# Patient Record
Sex: MALE | Race: WHITE | NOT HISPANIC OR LATINO | Employment: OTHER
[De-identification: names, ages, dates, MRNs, and addresses within clinical notes are randomized per-mention and may not be internally consistent; named-entity substitution may affect disease eponyms.]

---

## 2017-01-01 ENCOUNTER — HOSPITAL (OUTPATIENT)
Dept: OTHER | Age: 82
End: 2017-01-01
Attending: INTERNAL MEDICINE

## 2017-01-19 LAB — INR BLDC: 2.4

## 2017-02-01 ENCOUNTER — HOSPITAL (OUTPATIENT)
Dept: OTHER | Age: 82
End: 2017-02-01
Attending: INTERNAL MEDICINE

## 2017-02-01 PROBLEM — N39.43 POST-VOID DRIBBLING: Status: ACTIVE | Noted: 2017-02-01

## 2017-03-06 ENCOUNTER — HOSPITAL (OUTPATIENT)
Dept: OTHER | Age: 82
End: 2017-03-06
Attending: INTERNAL MEDICINE

## 2017-03-06 LAB — INR BLDC: 2.4

## 2017-04-01 ENCOUNTER — HOSPITAL (OUTPATIENT)
Dept: OTHER | Age: 82
End: 2017-04-01
Attending: INTERNAL MEDICINE

## 2017-04-07 LAB — INR BLDC: 2.4

## 2017-04-24 ENCOUNTER — PRIOR ORIGINAL RECORDS (OUTPATIENT)
Dept: OTHER | Age: 82
End: 2017-04-24

## 2017-05-01 ENCOUNTER — HOSPITAL (OUTPATIENT)
Dept: OTHER | Age: 82
End: 2017-05-01
Attending: INTERNAL MEDICINE

## 2017-05-08 LAB — INR BLDC: 1.9

## 2017-06-26 PROBLEM — I35.0 NONRHEUMATIC AORTIC VALVE STENOSIS: Status: ACTIVE | Noted: 2017-06-26

## 2017-07-07 ENCOUNTER — HOSPITAL (OUTPATIENT)
Dept: OTHER | Age: 82
End: 2017-07-07
Attending: INTERNAL MEDICINE

## 2017-07-07 LAB — INR BLDC: 1.6

## 2017-08-01 ENCOUNTER — HOSPITAL (OUTPATIENT)
Dept: OTHER | Age: 82
End: 2017-08-01
Attending: INTERNAL MEDICINE

## 2017-08-10 LAB — INR BLDC: 3.9

## 2017-09-01 ENCOUNTER — HOSPITAL (OUTPATIENT)
Dept: OTHER | Age: 82
End: 2017-09-01
Attending: INTERNAL MEDICINE

## 2017-09-07 LAB — INR BLDC: 2.4

## 2017-10-02 ENCOUNTER — HOSPITAL (OUTPATIENT)
Dept: OTHER | Age: 82
End: 2017-10-02
Attending: INTERNAL MEDICINE

## 2017-10-02 LAB
ANALYZER ANC (IANC): ABNORMAL
ANION GAP SERPL CALC-SCNC: 13 MMOL/L (ref 10–20)
BUN SERPL-MCNC: 20 MG/DL (ref 6–20)
BUN/CREAT SERPL: 14 (ref 7–25)
CALCIUM SERPL-MCNC: 8.3 MG/DL (ref 8.4–10.2)
CHLORIDE: 108 MMOL/L (ref 98–107)
CO2 SERPL-SCNC: 23 MMOL/L (ref 21–32)
CREAT SERPL-MCNC: 1.4 MG/DL (ref 0.67–1.17)
ERYTHROCYTE [DISTWIDTH] IN BLOOD: 14.6 % (ref 11–15)
GLUCOSE SERPL-MCNC: 144 MG/DL (ref 65–99)
HEMATOCRIT: 36.8 % (ref 39–51)
HGB BLD-MCNC: 12.3 GM/DL (ref 13–17)
INR PPP: 1.4
MCH RBC QN AUTO: 28.5 PG (ref 26–34)
MCHC RBC AUTO-ENTMCNC: 33.4 GM/DL (ref 32–36.5)
MCV RBC AUTO: 85.4 FL (ref 78–100)
PLATELET # BLD: 117 THOUSAND/MCL (ref 140–450)
POTASSIUM SERPL-SCNC: 4.4 MMOL/L (ref 3.4–5.1)
PROTHROMBIN TIME: 15.6 SECONDS (ref 9.7–11.8)
PROTHROMBIN TIME: ABNORMAL
RBC # BLD: 4.31 MILLION/MCL (ref 4.5–5.9)
SODIUM SERPL-SCNC: 140 MMOL/L (ref 135–145)
WBC # BLD: 5.2 THOUSAND/MCL (ref 4.2–11)

## 2017-10-03 ENCOUNTER — CHARTING TRANS (OUTPATIENT)
Dept: OTHER | Age: 82
End: 2017-10-03

## 2017-10-03 LAB
ANALYZER ANC (IANC): ABNORMAL
ANION GAP SERPL CALC-SCNC: 16 MMOL/L (ref 10–20)
BUN SERPL-MCNC: 23 MG/DL (ref 6–20)
BUN/CREAT SERPL: 17 (ref 7–25)
CALCIUM SERPL-MCNC: 8.9 MG/DL (ref 8.4–10.2)
CHLORIDE: 107 MMOL/L (ref 98–107)
CO2 SERPL-SCNC: 20 MMOL/L (ref 21–32)
CREAT SERPL-MCNC: 1.33 MG/DL (ref 0.67–1.17)
ERYTHROCYTE [DISTWIDTH] IN BLOOD: 14.8 % (ref 11–15)
GLUCOSE SERPL-MCNC: 112 MG/DL (ref 65–99)
HEMATOCRIT: 38.7 % (ref 39–51)
HGB BLD-MCNC: 13.2 GM/DL (ref 13–17)
MCH RBC QN AUTO: 28.9 PG (ref 26–34)
MCHC RBC AUTO-ENTMCNC: 34.1 GM/DL (ref 32–36.5)
MCV RBC AUTO: 84.9 FL (ref 78–100)
PLATELET # BLD: 124 THOUSAND/MCL (ref 140–450)
POTASSIUM SERPL-SCNC: 4.5 MMOL/L (ref 3.4–5.1)
RBC # BLD: 4.56 MILLION/MCL (ref 4.5–5.9)
SODIUM SERPL-SCNC: 138 MMOL/L (ref 135–145)
WBC # BLD: 6.4 THOUSAND/MCL (ref 4.2–11)

## 2017-10-04 LAB
ANALYZER ANC (IANC): ABNORMAL
ANION GAP SERPL CALC-SCNC: 15 MMOL/L (ref 10–20)
BUN SERPL-MCNC: 20 MG/DL (ref 6–20)
BUN/CREAT SERPL: 15 (ref 7–25)
CALCIUM SERPL-MCNC: 8.5 MG/DL (ref 8.4–10.2)
CHLORIDE: 109 MMOL/L (ref 98–107)
CO2 SERPL-SCNC: 21 MMOL/L (ref 21–32)
CREAT SERPL-MCNC: 1.35 MG/DL (ref 0.67–1.17)
ERYTHROCYTE [DISTWIDTH] IN BLOOD: 15.1 % (ref 11–15)
GLUCOSE SERPL-MCNC: 147 MG/DL (ref 65–99)
HEMATOCRIT: 32.9 % (ref 39–51)
HGB BLD-MCNC: 11.2 GM/DL (ref 13–17)
MCH RBC QN AUTO: 28.9 PG (ref 26–34)
MCHC RBC AUTO-ENTMCNC: 34 GM/DL (ref 32–36.5)
MCV RBC AUTO: 85 FL (ref 78–100)
PLATELET # BLD: 160 THOUSAND/MCL (ref 140–450)
POTASSIUM SERPL-SCNC: 4 MMOL/L (ref 3.4–5.1)
RBC # BLD: 3.87 MILLION/MCL (ref 4.5–5.9)
SODIUM SERPL-SCNC: 141 MMOL/L (ref 135–145)
WBC # BLD: 7.8 THOUSAND/MCL (ref 4.2–11)

## 2017-10-05 ENCOUNTER — HOSPITAL (OUTPATIENT)
Dept: OTHER | Age: 82
End: 2017-10-05
Attending: INTERNAL MEDICINE

## 2017-10-05 LAB — INR BLDC: 1.2

## 2017-10-09 ENCOUNTER — HOSPITAL ENCOUNTER (OUTPATIENT)
Dept: LAB | Facility: HOSPITAL | Age: 82
Discharge: HOME | End: 2017-10-09
Attending: THORACIC SURGERY (CARDIOTHORACIC VASCULAR SURGERY)
Payer: MEDICARE

## 2017-10-09 ENCOUNTER — HOSPITAL ENCOUNTER (OUTPATIENT)
Dept: ULTRASOUND IMAGING | Facility: HOSPITAL | Age: 82
Discharge: HOME | End: 2017-10-09
Attending: NURSE PRACTITIONER | Admitting: INTERNAL MEDICINE
Payer: MEDICARE

## 2017-10-09 ENCOUNTER — HOSPITAL ENCOUNTER (OUTPATIENT)
Dept: CT IMAGING | Facility: HOSPITAL | Age: 82
Discharge: HOME | End: 2017-10-09
Attending: NURSE PRACTITIONER
Payer: MEDICARE

## 2017-10-09 ENCOUNTER — HOSPITAL ENCOUNTER (OUTPATIENT)
Dept: INTERVENTIONAL RADIOLOGY/VASCULAR | Facility: HOSPITAL | Age: 82
Discharge: HOME | End: 2017-10-09
Attending: INTERNAL MEDICINE | Admitting: INTERNAL MEDICINE
Payer: MEDICARE

## 2017-10-09 ENCOUNTER — HOSPITAL ENCOUNTER (OUTPATIENT)
Dept: CV DIAGNOSTICS | Facility: HOSPITAL | Age: 82
Discharge: HOME | End: 2017-10-09
Attending: THORACIC SURGERY (CARDIOTHORACIC VASCULAR SURGERY)
Payer: MEDICARE

## 2017-10-09 ENCOUNTER — HOSPITAL ENCOUNTER (OUTPATIENT)
Dept: CT IMAGING | Facility: HOSPITAL | Age: 82
Discharge: HOME | End: 2017-10-09
Attending: NURSE PRACTITIONER | Admitting: INTERNAL MEDICINE
Payer: MEDICARE

## 2017-10-09 DIAGNOSIS — R09.89 OTHER SPECIFIED SYMPTOMS AND SIGNS INVOLVING THE CIRCULATORY AND RESPIRATORY SYSTEMS: ICD-10-CM

## 2017-10-09 DIAGNOSIS — I35.0 SEVERE AORTIC STENOSIS: ICD-10-CM

## 2017-10-09 DIAGNOSIS — I35.0 AORTIC STENOSIS: ICD-10-CM

## 2017-10-09 PROBLEM — I25.118 CORONARY ARTERY DISEASE OF NATIVE ARTERY OF NATIVE HEART WITH STABLE ANGINA PECTORIS (HCC): Status: ACTIVE | Noted: 2017-10-03

## 2017-10-09 PROCEDURE — 99212 OFFICE O/P EST SF 10 MIN: CPT | Performed by: INTERNAL MEDICINE

## 2017-10-09 PROCEDURE — 94010 BREATHING CAPACITY TEST: CPT

## 2017-10-09 PROCEDURE — 93010 ELECTROCARDIOGRAM REPORT: CPT | Performed by: INTERNAL MEDICINE

## 2017-10-09 PROCEDURE — 81001 URINALYSIS AUTO W/SCOPE: CPT

## 2017-10-09 PROCEDURE — 96360 HYDRATION IV INFUSION INIT: CPT

## 2017-10-09 PROCEDURE — 96361 HYDRATE IV INFUSION ADD-ON: CPT

## 2017-10-09 PROCEDURE — 75635 CT ANGIO ABDOMINAL ARTERIES: CPT | Performed by: NURSE PRACTITIONER

## 2017-10-09 PROCEDURE — 71275 CT ANGIOGRAPHY CHEST: CPT | Performed by: NURSE PRACTITIONER

## 2017-10-09 PROCEDURE — 93005 ELECTROCARDIOGRAM TRACING: CPT

## 2017-10-09 PROCEDURE — 93880 EXTRACRANIAL BILAT STUDY: CPT | Performed by: NURSE PRACTITIONER

## 2017-10-09 RX ORDER — SODIUM CHLORIDE 9 MG/ML
INJECTION, SOLUTION INTRAVENOUS CONTINUOUS
Status: ACTIVE | OUTPATIENT
Start: 2017-10-09 | End: 2017-10-09

## 2017-10-09 RX ADMIN — SODIUM CHLORIDE: 9 INJECTION, SOLUTION INTRAVENOUS at 08:15:00

## 2017-10-09 NOTE — PROCEDURES
Spirometry and flow volume loop appear normal with no evidence of airway obstruction or restriction.

## 2017-10-16 PROCEDURE — 81015 MICROSCOPIC EXAM OF URINE: CPT | Performed by: UROLOGY

## 2017-10-23 ENCOUNTER — ANESTHESIA EVENT (OUTPATIENT)
Dept: CARDIAC SURGERY | Facility: HOSPITAL | Age: 82
End: 2017-10-23

## 2017-10-23 ENCOUNTER — APPOINTMENT (OUTPATIENT)
Dept: GENERAL RADIOLOGY | Facility: HOSPITAL | Age: 82
DRG: 266 | End: 2017-10-23
Attending: NURSE PRACTITIONER
Payer: MEDICARE

## 2017-10-23 ENCOUNTER — HOSPITAL ENCOUNTER (INPATIENT)
Facility: HOSPITAL | Age: 82
LOS: 5 days | Discharge: HOME | DRG: 266 | End: 2017-10-28
Attending: INTERNAL MEDICINE | Admitting: INTERNAL MEDICINE
Payer: MEDICARE

## 2017-10-23 PROCEDURE — 85027 COMPLETE CBC AUTOMATED: CPT | Performed by: NURSE PRACTITIONER

## 2017-10-23 PROCEDURE — 87081 CULTURE SCREEN ONLY: CPT | Performed by: NURSE PRACTITIONER

## 2017-10-23 PROCEDURE — 71010 XR CHEST AP PORTABLE  (CPT=71010): CPT | Performed by: NURSE PRACTITIONER

## 2017-10-23 PROCEDURE — 86920 COMPATIBILITY TEST SPIN: CPT

## 2017-10-23 PROCEDURE — 05H933Z INSERTION OF INFUSION DEVICE INTO RIGHT BRACHIAL VEIN, PERCUTANEOUS APPROACH: ICD-10-PCS | Performed by: HOSPITALIST

## 2017-10-23 PROCEDURE — 86901 BLOOD TYPING SEROLOGIC RH(D): CPT | Performed by: NURSE PRACTITIONER

## 2017-10-23 PROCEDURE — 81003 URINALYSIS AUTO W/O SCOPE: CPT | Performed by: NURSE PRACTITIONER

## 2017-10-23 PROCEDURE — 83036 HEMOGLOBIN GLYCOSYLATED A1C: CPT | Performed by: NURSE PRACTITIONER

## 2017-10-23 PROCEDURE — 83735 ASSAY OF MAGNESIUM: CPT | Performed by: NURSE PRACTITIONER

## 2017-10-23 PROCEDURE — 36569 INSJ PICC 5 YR+ W/O IMAGING: CPT

## 2017-10-23 PROCEDURE — 83880 ASSAY OF NATRIURETIC PEPTIDE: CPT | Performed by: NURSE PRACTITIONER

## 2017-10-23 PROCEDURE — 85610 PROTHROMBIN TIME: CPT | Performed by: NURSE PRACTITIONER

## 2017-10-23 PROCEDURE — 76937 US GUIDE VASCULAR ACCESS: CPT

## 2017-10-23 PROCEDURE — 80053 COMPREHEN METABOLIC PANEL: CPT | Performed by: NURSE PRACTITIONER

## 2017-10-23 PROCEDURE — 93005 ELECTROCARDIOGRAM TRACING: CPT

## 2017-10-23 PROCEDURE — 93010 ELECTROCARDIOGRAM REPORT: CPT | Performed by: INTERNAL MEDICINE

## 2017-10-23 PROCEDURE — 86850 RBC ANTIBODY SCREEN: CPT | Performed by: NURSE PRACTITIONER

## 2017-10-23 PROCEDURE — 86900 BLOOD TYPING SEROLOGIC ABO: CPT | Performed by: NURSE PRACTITIONER

## 2017-10-23 PROCEDURE — B54MZZA ULTRASONOGRAPHY OF RIGHT UPPER EXTREMITY VEINS, GUIDANCE: ICD-10-PCS | Performed by: HOSPITALIST

## 2017-10-23 RX ORDER — ALFUZOSIN HYDROCHLORIDE 10 MG/1
10 TABLET, EXTENDED RELEASE ORAL
Status: DISCONTINUED | OUTPATIENT
Start: 2017-10-24 | End: 2017-10-28

## 2017-10-23 RX ORDER — SODIUM CHLORIDE 9 MG/ML
INJECTION, SOLUTION INTRAVENOUS CONTINUOUS
Status: DISCONTINUED | OUTPATIENT
Start: 2017-10-23 | End: 2017-10-24

## 2017-10-23 RX ORDER — CLOPIDOGREL BISULFATE 75 MG/1
75 TABLET ORAL DAILY
Status: DISCONTINUED | OUTPATIENT
Start: 2017-10-24 | End: 2017-10-28

## 2017-10-23 RX ORDER — FINASTERIDE 5 MG/1
5 TABLET, FILM COATED ORAL NIGHTLY
Status: DISCONTINUED | OUTPATIENT
Start: 2017-10-23 | End: 2017-10-28

## 2017-10-23 RX ORDER — ACETAMINOPHEN 160 MG
2000 TABLET,DISINTEGRATING ORAL DAILY
Status: DISCONTINUED | OUTPATIENT
Start: 2017-10-23 | End: 2017-10-28

## 2017-10-23 RX ORDER — ALPRAZOLAM 0.25 MG/1
0.25 TABLET ORAL
Status: DISCONTINUED | OUTPATIENT
Start: 2017-10-23 | End: 2017-10-28

## 2017-10-23 RX ORDER — CHLORHEXIDINE GLUCONATE 4 G/100ML
30 SOLUTION TOPICAL ONCE
Status: COMPLETED | OUTPATIENT
Start: 2017-10-23 | End: 2017-10-23

## 2017-10-23 RX ORDER — NORTRIPTYLINE HYDROCHLORIDE 10 MG/1
10 CAPSULE ORAL
Status: DISCONTINUED | OUTPATIENT
Start: 2017-10-23 | End: 2017-10-28

## 2017-10-23 RX ORDER — ATORVASTATIN CALCIUM 40 MG/1
40 TABLET, FILM COATED ORAL NIGHTLY
Status: DISCONTINUED | OUTPATIENT
Start: 2017-10-23 | End: 2017-10-28

## 2017-10-23 NOTE — CM/SW NOTE
10/23/17 1600   CM/SW Referral Data   Referral Source Social Work (self-referral)   Reason for Referral Discharge planning   Informant Patient   Pertinent Medical Hx   Primary Care Physician Name Cheko Hamilton   Patient Info   Patient's Mental Status Alert;Melvin

## 2017-10-23 NOTE — ANESTHESIA PREPROCEDURE EVALUATION
PRE-OP EVALUATION    Patient Name: Tania London    Pre-op Diagnosis: aortic stenosis    Procedure(s):  transcatheter aortic valve replacement     Surgeon(s) and Role:     * Rabia August MD - Primary     * Evangelina Ambriz MD    Pre-op vitals reviewe mouth daily as needed for Sleep.  Disp: 15 Tab Rfl: 5   ENALAPRIL MALEATE 2.5 MG OR TABS 1 TABLET daily at noon Disp:  Rfl:    ALLOPURINOL 100 MG OR TABS one half at noon time daily Disp:  Rfl:    SIMVASTATIN 80 MG OR TABS one half TABLET AT BEDTIME Disp: anxiety  (+) CVA                    CBP  Lung and prostate ca      Past Surgical History:  No date: APPENDECTOMY  2009: BACK SURGERY      Comment: CANCER  No date: CATARACT Bilateral  No date: CATH DRUG ELUTING STENT  No date: CHOLECYSTECTOMY  2011: Hilton Slaughter findings  Partial dentures          ASA: 4   Plan: general  NPO status verified and patient meets guidelines. Post-procedure pain management plan discussed with surgeon and patient.     Comment: D/w the patient the risks and benefits of general anesthesi

## 2017-10-23 NOTE — PLAN OF CARE
Received at 1100 am as direct admit from home for tavr in am. Admitted pt updated history and pta meds. Notified cardiology and [facility] hospitalist for admission. Mid line inserted, obtained ua and c&s, mrsa swab done. Labs, xray, and ekg done.  Consents on marichuy

## 2017-10-23 NOTE — H&P
[facility] Hospitalist H&P       CC: direct admit for TAVR    PCP: Daniela Molina MD    History of Present Illness: Pt is a 79 yo with mmp including but not limited to HTN/HL, CAD, severe aortic stenosis, acute on chronic diastolic heart failure, HISTORY      Comment: KIDNEY STONES  02/18/14: OTHER SURGICAL HISTORY      Comment: Prostate Biopsy - Dr. Kelli Hernandez  6/4/14: OTHER SURGICAL HISTORY      Comment: Cysto- Dr. Kelli Hernandez  No date: TONSILLECTOMY     ALL:    Aspirin                     Comment:CAN N °C) (Oral)   Resp 19   Ht 154.9 cm (5' 1\")   Wt 154 lb 1.6 oz (69.9 kg)   SpO2 100%   BMI 29.12 kg/m²   General:  Alert, NAD, appears younger than stated age   Head:  Normocephalic, without obvious abnormality, atraumatic.    Eyes:  Sclera anicteric,  EOMs anticoagulation given upcoming procedure    Outpatient records or previous hospital records reviewed. Further recommendations pending patient's clinical course.   [facility] hospitalist to continue to follow patient while in house    Patient and/or patient's f

## 2017-10-23 NOTE — CONSULTS
Cardiothoracic Surgery  TAVR Consult    TAVR Office Consult  10/9/17  Iliana Cleary  9/21/24  Dr Lc Mendez  Referring: Dr Jacobo Guido  PCP: Dr Darcy Santos  80year old with symptomatic aortic stenosis  Echo:  [place] . 76 cm2     M/P gradient: 41/74  m

## 2017-10-24 ENCOUNTER — SURGERY (OUTPATIENT)
Age: 82
End: 2017-10-24

## 2017-10-24 ENCOUNTER — APPOINTMENT (OUTPATIENT)
Dept: CV DIAGNOSTICS | Facility: HOSPITAL | Age: 82
DRG: 266 | End: 2017-10-24
Attending: NURSE PRACTITIONER
Payer: MEDICARE

## 2017-10-24 ENCOUNTER — ANESTHESIA (OUTPATIENT)
Dept: CARDIAC SURGERY | Facility: HOSPITAL | Age: 82
End: 2017-10-24

## 2017-10-24 PROCEDURE — 84132 ASSAY OF SERUM POTASSIUM: CPT

## 2017-10-24 PROCEDURE — 85347 COAGULATION TIME ACTIVATED: CPT

## 2017-10-24 PROCEDURE — S0028 INJECTION, FAMOTIDINE, 20 MG: HCPCS | Performed by: NURSE PRACTITIONER

## 2017-10-24 PROCEDURE — 87081 CULTURE SCREEN ONLY: CPT | Performed by: HOSPITALIST

## 2017-10-24 PROCEDURE — 80048 BASIC METABOLIC PNL TOTAL CA: CPT | Performed by: NURSE PRACTITIONER

## 2017-10-24 PROCEDURE — 82803 BLOOD GASES ANY COMBINATION: CPT

## 2017-10-24 PROCEDURE — 02RF38Z REPLACEMENT OF AORTIC VALVE WITH ZOOPLASTIC TISSUE, PERCUTANEOUS APPROACH: ICD-10-PCS | Performed by: INTERNAL MEDICINE

## 2017-10-24 PROCEDURE — 93005 ELECTROCARDIOGRAM TRACING: CPT

## 2017-10-24 PROCEDURE — 85027 COMPLETE CBC AUTOMATED: CPT | Performed by: NURSE PRACTITIONER

## 2017-10-24 PROCEDURE — 82330 ASSAY OF CALCIUM: CPT

## 2017-10-24 PROCEDURE — 84100 ASSAY OF PHOSPHORUS: CPT | Performed by: NURSE PRACTITIONER

## 2017-10-24 PROCEDURE — 93010 ELECTROCARDIOGRAM REPORT: CPT | Performed by: INTERNAL MEDICINE

## 2017-10-24 PROCEDURE — 85025 COMPLETE CBC W/AUTO DIFF WBC: CPT | Performed by: NURSE PRACTITIONER

## 2017-10-24 PROCEDURE — 85014 HEMATOCRIT: CPT

## 2017-10-24 PROCEDURE — 83735 ASSAY OF MAGNESIUM: CPT | Performed by: NURSE PRACTITIONER

## 2017-10-24 PROCEDURE — 83735 ASSAY OF MAGNESIUM: CPT | Performed by: HOSPITALIST

## 2017-10-24 PROCEDURE — 84295 ASSAY OF SERUM SODIUM: CPT

## 2017-10-24 PROCEDURE — 80053 COMPREHEN METABOLIC PANEL: CPT | Performed by: NURSE PRACTITIONER

## 2017-10-24 PROCEDURE — B410YZZ FLUOROSCOPY OF ABDOMINAL AORTA USING OTHER CONTRAST: ICD-10-PCS | Performed by: INTERNAL MEDICINE

## 2017-10-24 DEVICE — VALVE EVOLUT CORE 29MM: Type: IMPLANTABLE DEVICE | Site: AORTA | Status: FUNCTIONAL

## 2017-10-24 RX ORDER — DOCUSATE SODIUM 100 MG/1
100 CAPSULE, LIQUID FILLED ORAL 2 TIMES DAILY
Status: DISCONTINUED | OUTPATIENT
Start: 2017-10-24 | End: 2017-10-28

## 2017-10-24 RX ORDER — SODIUM CHLORIDE, SODIUM LACTATE, POTASSIUM CHLORIDE, CALCIUM CHLORIDE 600; 310; 30; 20 MG/100ML; MG/100ML; MG/100ML; MG/100ML
INJECTION, SOLUTION INTRAVENOUS CONTINUOUS
Status: DISCONTINUED | OUTPATIENT
Start: 2017-10-24 | End: 2017-10-24

## 2017-10-24 RX ORDER — BISACODYL 10 MG
10 SUPPOSITORY, RECTAL RECTAL
Status: DISCONTINUED | OUTPATIENT
Start: 2017-10-24 | End: 2017-10-28

## 2017-10-24 RX ORDER — HYDRALAZINE HYDROCHLORIDE 20 MG/ML
10 INJECTION INTRAMUSCULAR; INTRAVENOUS EVERY 4 HOURS PRN
Status: DISCONTINUED | OUTPATIENT
Start: 2017-10-24 | End: 2017-10-28

## 2017-10-24 RX ORDER — FAMOTIDINE 20 MG/1
20 TABLET ORAL DAILY
Status: DISCONTINUED | OUTPATIENT
Start: 2017-10-24 | End: 2017-10-28

## 2017-10-24 RX ORDER — MORPHINE SULFATE 4 MG/ML
2 INJECTION, SOLUTION INTRAMUSCULAR; INTRAVENOUS EVERY 2 HOUR PRN
Status: DISCONTINUED | OUTPATIENT
Start: 2017-10-24 | End: 2017-10-28

## 2017-10-24 RX ORDER — NALOXONE HYDROCHLORIDE 0.4 MG/ML
80 INJECTION, SOLUTION INTRAMUSCULAR; INTRAVENOUS; SUBCUTANEOUS AS NEEDED
Status: ACTIVE | OUTPATIENT
Start: 2017-10-24 | End: 2017-10-24

## 2017-10-24 RX ORDER — LACTULOSE 20 G/30ML
20 SOLUTION ORAL DAILY PRN
Status: DISCONTINUED | OUTPATIENT
Start: 2017-10-24 | End: 2017-10-28

## 2017-10-24 RX ORDER — SODIUM CHLORIDE 9 MG/ML
INJECTION, SOLUTION INTRAVENOUS CONTINUOUS
Status: DISCONTINUED | OUTPATIENT
Start: 2017-10-24 | End: 2017-10-28

## 2017-10-24 RX ORDER — DOBUTAMINE HYDROCHLORIDE 200 MG/100ML
INJECTION INTRAVENOUS CONTINUOUS PRN
Status: DISCONTINUED | OUTPATIENT
Start: 2017-10-24 | End: 2017-10-28

## 2017-10-24 RX ORDER — HYDROCODONE BITARTRATE AND ACETAMINOPHEN 5; 325 MG/1; MG/1
1 TABLET ORAL EVERY 6 HOURS PRN
Status: DISCONTINUED | OUTPATIENT
Start: 2017-10-24 | End: 2017-10-28

## 2017-10-24 RX ORDER — ALBUMIN, HUMAN INJ 5% 5 %
250 SOLUTION INTRAVENOUS ONCE AS NEEDED
Status: ACTIVE | OUTPATIENT
Start: 2017-10-24 | End: 2017-10-24

## 2017-10-24 RX ORDER — HEPARIN SODIUM 5000 [USP'U]/ML
INJECTION, SOLUTION INTRAVENOUS; SUBCUTANEOUS
Status: COMPLETED
Start: 2017-10-24 | End: 2017-10-24

## 2017-10-24 RX ORDER — PROTAMINE SULFATE 10 MG/ML
INJECTION, SOLUTION INTRAVENOUS
Status: COMPLETED
Start: 2017-10-24 | End: 2017-10-24

## 2017-10-24 RX ORDER — METOCLOPRAMIDE HYDROCHLORIDE 5 MG/ML
10 INJECTION INTRAMUSCULAR; INTRAVENOUS AS NEEDED
Status: ACTIVE | OUTPATIENT
Start: 2017-10-24 | End: 2017-10-24

## 2017-10-24 RX ORDER — FAMOTIDINE 10 MG/ML
20 INJECTION, SOLUTION INTRAVENOUS DAILY
Status: DISCONTINUED | OUTPATIENT
Start: 2017-10-24 | End: 2017-10-27

## 2017-10-24 RX ORDER — ONDANSETRON 2 MG/ML
4 INJECTION INTRAMUSCULAR; INTRAVENOUS EVERY 6 HOURS PRN
Status: ACTIVE | OUTPATIENT
Start: 2017-10-24 | End: 2017-10-26

## 2017-10-24 RX ORDER — ONDANSETRON 2 MG/ML
4 INJECTION INTRAMUSCULAR; INTRAVENOUS AS NEEDED
Status: ACTIVE | OUTPATIENT
Start: 2017-10-24 | End: 2017-10-24

## 2017-10-24 RX ORDER — NITROGLYCERIN 20 MG/100ML
INJECTION INTRAVENOUS CONTINUOUS PRN
Status: DISCONTINUED | OUTPATIENT
Start: 2017-10-24 | End: 2017-10-28

## 2017-10-24 RX ORDER — MORPHINE SULFATE 4 MG/ML
4 INJECTION, SOLUTION INTRAMUSCULAR; INTRAVENOUS EVERY 2 HOUR PRN
Status: DISCONTINUED | OUTPATIENT
Start: 2017-10-24 | End: 2017-10-28

## 2017-10-24 RX ORDER — POLYETHYLENE GLYCOL 3350 17 G/17G
1 POWDER, FOR SOLUTION ORAL DAILY PRN
Status: DISCONTINUED | OUTPATIENT
Start: 2017-10-24 | End: 2017-10-28

## 2017-10-24 RX ORDER — CEFAZOLIN SODIUM 1 G/3ML
INJECTION, POWDER, FOR SOLUTION INTRAMUSCULAR; INTRAVENOUS
Status: DISCONTINUED | OUTPATIENT
Start: 2017-10-24 | End: 2017-10-24 | Stop reason: ALTCHOICE

## 2017-10-24 NOTE — PLAN OF CARE
Pt received at [phone] [id number] s/p TAVR, extubated shortly after arrival to CNICU per anesthesia and RT, tolerated well, scant bloody drainage from mouth noted, a/ox4, following commands, SR on monitor with new BBB and 1st degree AV Block- EKG completed with results

## 2017-10-24 NOTE — PROGRESS NOTES
[facility] Hospitalist Progress Note     PCP: Jesus Merino MD    CC:  Follow up    SUBJECTIVE:  Pt down for TAVR at the time of my rounds. Patient not seen. Chart reviewed.     OBJECTIVE:  Temp:  [97.6 °F (36.4 °C)-98 °F (36.7 °C)] 97.6 °F (36.4 °C tolerate cpap. Monitor     **BPH-stable, continue home meds     **CKD-appears to be at baseline, monitor     **chronic anemia- suspect secondary to ckd, monitor     **PPx-scds    Questions/concerns were discussed with patient and/or family by bedside.

## 2017-10-24 NOTE — PROGRESS NOTES
[facility] Cardiology  Progress Note    Nj Double Patient Status:  Inpatient    1924 MRN [id number]   [facility] 2NE-A Attending Chon Avalos, *   Hosp Day # 1 PCP Lainey Thorne MD     The patient mean/peak gradient 41/74mmHg, peak velocity 4.3m/sec, AI trace, MR mild, TR milde        Most recent cath was: on 10/3/17 showed: 90% prox RCA, mid RCA 70% with angioplasty and OMAR to these lesions        HISTORY:       Past Medical History:   Diagnosis Da reports that he drinks alcohol.  He reports that he does not use drugs.       Allergies:     Aspirin                     Comment:CAN NOT TAKE BECAUSE OF COUMADIN   Current Meds:     Current Outpatient Prescriptions:  Clopidogrel Bisulfate 75 MG Oral Tab Ken Se 1.46 (H)   ----------     EKG showed:  Normal sinus rhythm with 1st degree AVB and PAC's        ASSESSMENT/PLAN:      1. Severe Aortic Stenosis- Discussed TAVR procedure at length. Patient is going to have pre-procedure workup completed today.  Benefits and

## 2017-10-24 NOTE — OPERATIVE REPORT
[address] [place]    PATIENT'S NAME: Mireille Jessica   ATTENDING PHYSICIAN: Mendy Little. Angelica Farley MD   OPERATING PHYSICIAN: Travis Bashir M.D.    PATIENT ACCOUNT#:   [de-identified]    LOCATION:  [address] [place] [address]  MEDICAL RECORD #:   [id number]       DATE OF BIRTH M.D.  d: 10/24/2017 12:07:09  t: 10/24/2017 13:47:28  [facility] [id number]  [facility]/    cc: Standley Laws, M.D. Jerene Larve, M.D. Tamar Dunn Michaele Hatchet, M.D.

## 2017-10-24 NOTE — PROGRESS NOTES
[facility] Cardiology  Progress Note    Salima Daniels Patient Status:  Inpatient    1924 MRN [id number]   [facility] 6NE-A Attending Carly Watts, [address] Day # 1 PCP Mickael Boast, MD     ADDENDUM:  Claudia Green norepinephrine (LEVOPHED) 4 mg/250 ml premix infusion 0.5-30 mcg/min Intravenous Continuous PRN   EPINEPHrine HCl (ADRENALIN) 4 mg in sodium chloride 0.9 % 250 mL infusion 1-10 mcg/min Intravenous Continuous PRN   nitroGLYCERIN infusion 50mg in D5W 250ml Units Oral Daily   Clopidogrel Bisulfate (PLAVIX) tab 75 mg 75 mg Oral Daily   atorvastatin (LIPITOR) tab 40 mg 40 mg Oral Nightly       Laboratory Data:    Lab Results  Component Value Date   WBC 5.7 10/24/2017   HGB 10.9 10/24/2017   HCT 33.2 10/24/2017

## 2017-10-24 NOTE — OPERATIVE REPORT
Cardiovascular Surgery Operative Note  TAVR            INDICATIONS:         80year old with symptomatic aortic stenosis  STS Risk Score: 7.3 %     Euroscore: 3.9 %  The assessment is that the patient is at intermediate risk for open AVR and would be best

## 2017-10-24 NOTE — PROGRESS NOTES
PROCEDURE PERFORMED: Transcatheter aortic valve replacement, Evolute Pro 29mm aortic valve, right  transfemoral approach percutaneously. OPERATORS:   1. Cardiovascular surgeon, Dr. Kimmie Chandra.   2. Interventional Cardiology, Dr. Gia Oliver and Dr. Daphne Oliveros

## 2017-10-24 NOTE — PROGRESS NOTES
Extubation note    Patient was extubated at bedside. Awake and alert. Follwing commands. On face mask, NRB.  100% SpO2.       Adelfo Lake

## 2017-10-24 NOTE — OPERATIVE REPORT
[address] [place]    PATIENT'S NAME: Joselito Ortiz   ATTENDING PHYSICIAN: Oj Bill.  Nayeli Kelley MD   OPERATING PHYSICIAN: Haydee Briones MD   PATIENT ACCOUNT#:   [id number]    [facility] [address] [place] [address]  MEDICAL RECORD #:   [id number]       DATE OF BIRTH:  [birth year] MD  d: 10/23/2017 17:31:45  t: 10/24/2017 06:34:08  [facility] [id number]  /

## 2017-10-24 NOTE — ANESTHESIA POSTPROCEDURE EVALUATION
[facility] Status:  [facility]   Age/Gender 80year old male MRN [id number]   [facility] 6NE-A Attending Salvatore Harada, [facility] Day # 1 PCP Anrdeia Dyson MD       Anesthesia Post-op Note

## 2017-10-24 NOTE — PROGRESS NOTES
[facility] Pharmacy Note:  Renal Dose Adjustment    Evin Barrientos has been prescribed famotidine (PEPCID) 20 mg intravenously or orally very 12 hours. Estimated Creatinine Clearance: 25.9 mL/min (based on SCr of 1.32 mg/dL (H)).     His calculated creatinine

## 2017-10-25 ENCOUNTER — APPOINTMENT (OUTPATIENT)
Dept: CV DIAGNOSTICS | Facility: HOSPITAL | Age: 82
DRG: 266 | End: 2017-10-25
Attending: NURSE PRACTITIONER
Payer: MEDICARE

## 2017-10-25 PROCEDURE — 93306 TTE W/DOPPLER COMPLETE: CPT | Performed by: NURSE PRACTITIONER

## 2017-10-25 PROCEDURE — 93005 ELECTROCARDIOGRAM TRACING: CPT

## 2017-10-25 PROCEDURE — 97530 THERAPEUTIC ACTIVITIES: CPT

## 2017-10-25 PROCEDURE — 97165 OT EVAL LOW COMPLEX 30 MIN: CPT

## 2017-10-25 PROCEDURE — 83735 ASSAY OF MAGNESIUM: CPT | Performed by: NURSE PRACTITIONER

## 2017-10-25 PROCEDURE — 80048 BASIC METABOLIC PNL TOTAL CA: CPT | Performed by: NURSE PRACTITIONER

## 2017-10-25 PROCEDURE — 97161 PT EVAL LOW COMPLEX 20 MIN: CPT

## 2017-10-25 PROCEDURE — 85027 COMPLETE CBC AUTOMATED: CPT | Performed by: NURSE PRACTITIONER

## 2017-10-25 PROCEDURE — 93010 ELECTROCARDIOGRAM REPORT: CPT | Performed by: INTERNAL MEDICINE

## 2017-10-25 RX ORDER — ACETAMINOPHEN 325 MG/1
650 TABLET ORAL EVERY 6 HOURS PRN
Status: DISCONTINUED | OUTPATIENT
Start: 2017-10-25 | End: 2017-10-28

## 2017-10-25 RX ORDER — WARFARIN SODIUM 2.5 MG/1
2.5 TABLET ORAL NIGHTLY
Status: DISCONTINUED | OUTPATIENT
Start: 2017-10-25 | End: 2017-10-25

## 2017-10-25 RX ORDER — CHLORHEXIDINE GLUCONATE 4 G/100ML
30 SOLUTION TOPICAL
Status: COMPLETED | OUTPATIENT
Start: 2017-10-26 | End: 2017-10-26

## 2017-10-25 NOTE — PROGRESS NOTES
[address] [place] [address] Cardiology  Progress Note    Wilmore Jorge Patient Status:  Inpatient    1924 MRN [id number]   [facility] 6NE-A Attending Evelia Gunter, [facility] Day # 2 PCP Angie Felipe MD     ADDENDUM:  Richardson Goodpasture normoactive  Extremities: No clubbing/cyanosis; moves all 4 extremities normally      Current Facility-Administered Medications:  0.9%  NaCl infusion  Intravenous Continuous   DOBUTamine in D5W (DOBUTREX) 500 mg/250 ml infusion 2.5-10 mcg/kg/min Intravenou Daily   Clopidogrel Bisulfate (PLAVIX) tab 75 mg 75 mg Oral Daily   atorvastatin (LIPITOR) tab 40 mg 40 mg Oral Nightly       Laboratory Data:    Lab Results  Component Value Date   WBC 10.5 10/25/2017   HGB 11.9 10/25/2017   HCT 35.7 10/25/2017   .

## 2017-10-25 NOTE — PLAN OF CARE
Assumed care at [address]. Pt. No longer having vision issues. Up in chair at 2100 and back to bed at 2230. Pt. Tolerated activity well. Bilateral groin sites with dressings D/I, surrounding areas soft with ecchymosis present. Pt. Denies discomfort.   With

## 2017-10-25 NOTE — PLAN OF CARE
Patient/Family Goals    • Patient/Family Long Term Goal Progressing    • Patient/Family Short Term Goal Progressing          SKIN/TISSUE INTEGRITY - ADULT    • Skin integrity remains intact Progressing    • Incision(s), wounds(s) or drain site(s) healing w

## 2017-10-25 NOTE — PROGRESS NOTES
[facility] Hospitalist Progress Note     PCP: Georgia Heck MD    CC:  Follow up    SUBJECTIVE: pt seen earlier, note delayed    Pt laying in bed, wife and RN at bedside.   Pt without cp/sob/n/v/f/c    OBJECTIVE:  Temp:  [97.2 °F (36.2 °C)-98.5 °F mg Oral BID    Or   • docusate sodium  100 mg Per NG Tube BID   • famoTIDine  20 mg Oral Daily    Or   • famoTIDine  20 mg Intravenous Daily   • finasteride  5 mg Oral Nightly   • Nortriptyline HCl  10 mg Oral Noon   • Alfuzosin HCl ER  10 mg Oral Daily wi

## 2017-10-25 NOTE — CM/SW NOTE
SW met with pt to assess post TAVR. PT/OT did not identify any dc needs. Plan is for pt to dc home with spouse. No needs at dc.   Miriam Helm, 10/25/17, 11:42 AM

## 2017-10-25 NOTE — PHYSICAL THERAPY NOTE
PHYSICAL THERAPY QUICK EVALUATION - INPATIENT    Room Number: 5624/0205-G  Evaluation Date: 10/25/2017  Presenting Problem: s/p TAVR 10/24/17  Physician Order: PT Eval and Treat    Problem List  Active Problems:    * No active hospital problems.  *      P Tye  6/4/14: OTHER SURGICAL HISTORY      Comment: Cysto- Dr. Carlota Durham  No date: [facility]  Type of Home: Condo   Home Layout: One level                Lives With: Spouse  Drives: Yes  Patient Owned Equipment: None  Patient Regularl sit-stand sans AD independently. Pt ambulated 250ft sans AD with MOD I. Pt ambulates with steady step through gait pattern and good consuelo. Pt with good safety awareness and no LOB. Pt returned to room sitting up in bedside chair.  Pt educated on recommend

## 2017-10-25 NOTE — CONSULTS
[facility] Electrophysiology Consult      Estella Colindres is a 80year old male    He is s/p TAVR yesterday.  Prior he had a 1st degree AV block and post he has a 1st degree AVB and LBBB    Denies chest pain, shortness of breath, palpitations, lightheadedness, sy The risks and benefits of the procedure (including, but not limited to, hematoma, infection, pneumothorax, tamponade, renal failure from IV dye, damage to any existing leads, myocardial infarction, stroke, and death) were discussed.  The patient understands

## 2017-10-25 NOTE — OCCUPATIONAL THERAPY NOTE
OCCUPATIONAL THERAPY QUICK EVALUATION - INPATIENT    Room Number: 4517/6590-V  Evaluation Date: 10/25/2017     Type of Evaluation: Initial  Presenting Problem: TAVR    Physician Order: IP Consult to Occupational Therapy  Reason for Therapy:  ADL/IADL Dysfu CHOLECYSTECTOMY  2011: COLONOSCOPY      Comment: benign polyps Dr. Romayne Plant  No date: HERNIA SURGERY Left  No date: OTHER SURGICAL HISTORY      Comment: KIDNEY STONES  02/18/14: OTHER SURGICAL HISTORY      Comment: Prostate Biopsy - Dr. Alexandra Jarvis  6/4/14: OTHER ASSESSMENT  Supine to Sit : Supervision  Sit to Stand: Supervision    Skilled Therapy Provided: Pt was received supine in bed for session, therapist completed MMT and ROM on pt, pt was able to amb x1 in room and hallway with supervision, therapist assist p independently

## 2017-10-26 ENCOUNTER — APPOINTMENT (OUTPATIENT)
Dept: INTERVENTIONAL RADIOLOGY/VASCULAR | Facility: HOSPITAL | Age: 82
DRG: 266 | End: 2017-10-26
Attending: INTERNAL MEDICINE
Payer: MEDICARE

## 2017-10-26 ENCOUNTER — APPOINTMENT (OUTPATIENT)
Dept: GENERAL RADIOLOGY | Facility: HOSPITAL | Age: 82
DRG: 266 | End: 2017-10-26
Attending: INTERNAL MEDICINE
Payer: MEDICARE

## 2017-10-26 PROCEDURE — 71010 XR CHEST AP PORTABLE  (CPT=71010): CPT | Performed by: INTERNAL MEDICINE

## 2017-10-26 PROCEDURE — 02HK3JZ INSERTION OF PACEMAKER LEAD INTO RIGHT VENTRICLE, PERCUTANEOUS APPROACH: ICD-10-PCS | Performed by: INTERNAL MEDICINE

## 2017-10-26 PROCEDURE — 85018 HEMOGLOBIN: CPT | Performed by: HOSPITALIST

## 2017-10-26 PROCEDURE — 33208 INSRT HEART PM ATRIAL & VENT: CPT

## 2017-10-26 PROCEDURE — 0JH606Z INSERTION OF PACEMAKER, DUAL CHAMBER INTO CHEST SUBCUTANEOUS TISSUE AND FASCIA, OPEN APPROACH: ICD-10-PCS | Performed by: INTERNAL MEDICINE

## 2017-10-26 PROCEDURE — 83735 ASSAY OF MAGNESIUM: CPT | Performed by: HOSPITALIST

## 2017-10-26 PROCEDURE — 80048 BASIC METABOLIC PNL TOTAL CA: CPT | Performed by: NURSE PRACTITIONER

## 2017-10-26 PROCEDURE — 99152 MOD SED SAME PHYS/QHP 5/>YRS: CPT

## 2017-10-26 PROCEDURE — 85610 PROTHROMBIN TIME: CPT | Performed by: NURSE PRACTITIONER

## 2017-10-26 PROCEDURE — 99153 MOD SED SAME PHYS/QHP EA: CPT

## 2017-10-26 PROCEDURE — 02H63JZ INSERTION OF PACEMAKER LEAD INTO RIGHT ATRIUM, PERCUTANEOUS APPROACH: ICD-10-PCS | Performed by: INTERNAL MEDICINE

## 2017-10-26 RX ORDER — WARFARIN SODIUM 2.5 MG/1
2.5 TABLET ORAL
Status: COMPLETED | OUTPATIENT
Start: 2017-10-26 | End: 2017-10-26

## 2017-10-26 RX ORDER — ONDANSETRON 2 MG/ML
4 INJECTION INTRAMUSCULAR; INTRAVENOUS EVERY 6 HOURS PRN
Status: DISCONTINUED | OUTPATIENT
Start: 2017-10-26 | End: 2017-10-28

## 2017-10-26 RX ORDER — BACITRACIN 50000 [USP'U]/1
INJECTION, POWDER, LYOPHILIZED, FOR SOLUTION INTRAMUSCULAR
Status: COMPLETED
Start: 2017-10-26 | End: 2017-10-26

## 2017-10-26 RX ORDER — MIDAZOLAM HYDROCHLORIDE 1 MG/ML
INJECTION INTRAMUSCULAR; INTRAVENOUS
Status: COMPLETED
Start: 2017-10-26 | End: 2017-10-26

## 2017-10-26 RX ORDER — LIDOCAINE HYDROCHLORIDE 10 MG/ML
INJECTION, SOLUTION INFILTRATION; PERINEURAL
Status: COMPLETED
Start: 2017-10-26 | End: 2017-10-26

## 2017-10-26 NOTE — PLAN OF CARE
Care asumed @ 0730. Awake, alert & oriented, sitting up in chair. NPO for Pacemaker placement today. RSR with 1st degree AV block & BBB. Up to BR for 100-200cc voidings.   CARDIOVASCULAR - ADULT    • Maintains optimal cardiac output and hemodynamic stabilit

## 2017-10-26 NOTE — PLAN OF CARE
Pt oriented, calm, appropriate. He knows he is for [facility] tomorrow. Minimal assist to bathroom; BM attempted, no BM. Using call light appropriately.

## 2017-10-26 NOTE — PROGRESS NOTES
[facility] Hospitalist Progress Note     PCP: Haily Peraza MD    CC:  Follow up    SUBJECTIVE:   Pt sitting up in bed, wife at bedside. No current cp/sob/n/v/f/c.  Awaiting pacer    OBJECTIVE:  Temp:  [97.6 °F (36.4 °C)-97.8 °F (36.6 °C)] 97.7 mg Oral Nightly   • Nortriptyline HCl  10 mg Oral Noon   • Alfuzosin HCl ER  10 mg Oral Daily with breakfast   • Vitamin D3  2,000 Units Oral Daily   • Clopidogrel Bisulfate  75 mg Oral Daily   • atorvastatin  40 mg Oral Nightly     • sodium chloride Stopp

## 2017-10-26 NOTE — PROGRESS NOTES
[facility] [address] [place] [address] Cardiology  Progress Note    Era Marr Patient Status:  Inpatient    1924 MRN [id number]   [facility] 6NE-A Attending Dylan Calderon, [facility] Day # 3 PCP Kirsten Duverney, MD     ADDENDUM:  Josh Pringle 0.9%  NaCl infusion  Intravenous Continuous   DOBUTamine in D5W (DOBUTREX) 500 mg/250 ml infusion 2.5-10 mcg/kg/min Intravenous Continuous PRN   norepinephrine (LEVOPHED) 4 mg/250 ml premix infusion 0.5-30 mcg/min Intravenous Continuous PRN   EPINEPHrine 10/26/2017       Lab Results  Component Value Date   INR 1.33 (H) 10/26/2017   INR 1.37 (H) 10/23/2017   INR 2.7 (A) 09/14/2015       Lab Results  Component Value Date    10/26/2017   K 4.0 10/26/2017    10/26/2017   CO2 25.0 10/26/2017   BUN 2

## 2017-10-26 NOTE — CM/SW NOTE
ERICK met with pt and wife to discuss transportation issues. Pt is scheduled for [facility] today and most likely will dc home tomorrow.   Wife does drive but states she is a little \"shaken\" over everything that pt had gone through so does not feel comforta

## 2017-10-27 ENCOUNTER — APPOINTMENT (OUTPATIENT)
Dept: GENERAL RADIOLOGY | Facility: HOSPITAL | Age: 82
DRG: 266 | End: 2017-10-27
Attending: INTERNAL MEDICINE
Payer: MEDICARE

## 2017-10-27 PROCEDURE — 71020 XR CHEST PA + LAT CHEST (CPT=71020): CPT | Performed by: INTERNAL MEDICINE

## 2017-10-27 PROCEDURE — 85610 PROTHROMBIN TIME: CPT | Performed by: NURSE PRACTITIONER

## 2017-10-27 PROCEDURE — 83735 ASSAY OF MAGNESIUM: CPT | Performed by: HOSPITALIST

## 2017-10-27 PROCEDURE — 85018 HEMOGLOBIN: CPT | Performed by: HOSPITALIST

## 2017-10-27 PROCEDURE — 4B02XSZ MEASUREMENT OF CARDIAC PACEMAKER, EXTERNAL APPROACH: ICD-10-PCS | Performed by: INTERNAL MEDICINE

## 2017-10-27 RX ORDER — ACETAMINOPHEN 500 MG
1000 TABLET ORAL EVERY 6 HOURS
Status: DISCONTINUED | OUTPATIENT
Start: 2017-10-27 | End: 2017-10-28

## 2017-10-27 RX ORDER — ACETAMINOPHEN 325 MG/1
650 TABLET ORAL EVERY 6 HOURS PRN
Qty: 15 TABLET | Refills: 0 | Status: SHIPPED | COMMUNITY
Start: 2017-10-27

## 2017-10-27 NOTE — PROGRESS NOTES
[facility] Hospitalist Progress Note     PCP: Annika Rand MD    CC:  Follow up    SUBJECTIVE:   Pt s/p pacer. Sitting up in chair, pain to pacer site-- says it's sore. Says he has high tolerance for pain.  Breathing is fine    Says narcotics 100 mg Oral BID    Or   • docusate sodium  100 mg Per NG Tube BID   • famoTIDine  20 mg Oral Daily   • finasteride  5 mg Oral Nightly   • Nortriptyline HCl  10 mg Oral Noon   • Alfuzosin HCl ER  10 mg Oral Daily with breakfast   • Vitamin D3  2,000 Units

## 2017-10-27 NOTE — PLAN OF CARE
Patient is alert and oriented, forgetful and anxious. On room air, NSR on the tele. Have not seen any pacing. Coumadin was restarted last night. Midline flushed, blood return. Patient took gray dressing off put new one on. Denies any pain or SOB. Sit

## 2017-10-27 NOTE — PROCEDURES
[facility]    PATIENT'S NAME: Tanya Rios   ATTENDING PHYSICIAN: Dariana Hanks. Kassidy Yen MD   OPERATING PHYSICIAN: Yanick Combs M.D.    PATIENT ACCOUNT#:   [de-identified]    LOCATION:  [address] [place] [address]  MEDICAL RECORD #:   [id number]       DATE OF BIRTH In its final position, it was sutured in place. As measured through this device, initial lead thresholds were good. A pocket was made inferior and medial to the incision in the prepectoral space.   This pocket was examined for bleeding and clots, and no

## 2017-10-27 NOTE — PROGRESS NOTES
[facility] [address] [place] [address] Cardiology  Progress Note    Kim Marti Patient Status:  Inpatient    1924 MRN [id number]   [facility] 6NE-A Attending Denia Bennett, *   Hosp Day # 4 PCP Harleen Evans MD     Impression: norepinephrine (LEVOPHED) 4 mg/250 ml premix infusion 0.5-30 mcg/min Intravenous Continuous PRN   EPINEPHrine HCl (ADRENALIN) 4 mg in sodium chloride 0.9 % 250 mL infusion 1-10 mcg/min Intravenous Continuous PRN   nitroGLYCERIN infusion 50mg in D5W 250ml

## 2017-10-27 NOTE — PLAN OF CARE
Problem: CARDIOVASCULAR - ADULT  Goal: Maintains optimal cardiac output and hemodynamic stability  INTERVENTIONS:  - Monitor vital signs, rhythm, and trends  - Monitor for bleeding, hypotension and signs of decreased cardiac output  - Evaluate effectivenes Outcome: Progressing      Problem: MUSCULOSKELETAL - ADULT  Goal: Return mobility to safest level of function  INTERVENTIONS:  - Assess patient stability and activity tolerance for standing, transferring and ambulating w/ or w/o assistive devices  - Assi

## 2017-10-28 VITALS
WEIGHT: 150.38 LBS | OXYGEN SATURATION: 98 % | HEIGHT: 61 IN | SYSTOLIC BLOOD PRESSURE: 159 MMHG | DIASTOLIC BLOOD PRESSURE: 67 MMHG | BODY MASS INDEX: 28.39 KG/M2 | RESPIRATION RATE: 16 BRPM | TEMPERATURE: 99 F | HEART RATE: 73 BPM

## 2017-10-28 PROCEDURE — 80048 BASIC METABOLIC PNL TOTAL CA: CPT | Performed by: INTERNAL MEDICINE

## 2017-10-28 NOTE — PROGRESS NOTES
[facility] Cardiology  Progress Note    Thera Persons Patient Status:  Inpatient    1924 MRN [id number]   [facility] 6NE-A Attending Sami Cano, *   Hosp Day # 5 PCP Wyatt Jc MD     Impression: DOBUTamine in D5W (DOBUTREX) 500 mg/250 ml infusion 2.5-10 mcg/kg/min Intravenous Continuous PRN   norepinephrine (LEVOPHED) 4 mg/250 ml premix infusion 0.5-30 mcg/min Intravenous Continuous PRN   EPINEPHrine HCl (ADRENALIN) 4 mg in sodium chloride 0.9 % 10/28/2017   K 3.9 10/28/2017    10/28/2017   CO2 20.0 10/28/2017   BUN 23 10/28/2017   CREATSERUM 1.32 10/28/2017    10/28/2017   CA 8.4 10/28/2017

## 2017-10-28 NOTE — PLAN OF CARE
Patient is alert and oriented. On room air, NSR on the tele. Denies any pain or SOB. Patient ambulates with SBA. Left upper chest site is c/d/i. Dressing in place. POC updated, patient will be discharged when wife arrives today.    Call light within re

## 2017-10-28 NOTE — PLAN OF CARE
Patient seen and examined. The patient may be discharged from a cardiac standpoint. I personally reviewed all of the cardiac medications for discharge. The patient should follow-up in our office, as noted in the discharge instructions.     Given his recent

## 2017-10-28 NOTE — PLAN OF CARE
Problem: CARDIOVASCULAR - ADULT  Goal: Maintains optimal cardiac output and hemodynamic stability  INTERVENTIONS:  - Monitor vital signs, rhythm, and trends  - Monitor for bleeding, hypotension and signs of decreased cardiac output  - Evaluate effectivenes stability and activity tolerance for standing, transferring and ambulating w/ or w/o assistive devices  - Assist with transfers and ambulation using safe patient handling equipment as needed  - Ensure adequate protection for wounds/incisions during mobiliz

## 2017-10-28 NOTE — DISCHARGE SUMMARY
General Medicine Discharge Summary     Patient ID:  Mario Alberto Ok  80year old  9/21/1924    Admit date: 10/23/2017    Discharge date and time:  10/28/17    Attending Physician: Trey Marie CONSULT TO SOCIAL WORK    Operative Procedures: Procedure(s) (LRB):  HEART TRANS AORTIC VALVE REPLACEMENT (N/A)       Patient instructions:      Current Discharge Medication List    START taking these medications    acetaminophen 325 MG Oral Tab  Take 2 ta

## 2017-10-30 LAB — INR BLDC: 1.3

## 2017-11-01 ENCOUNTER — HOSPITAL (OUTPATIENT)
Dept: OTHER | Age: 82
End: 2017-11-01
Attending: INTERNAL MEDICINE

## 2017-11-03 PROBLEM — Z95.0 PACEMAKER: Status: ACTIVE | Noted: 2017-11-03

## 2017-11-03 PROBLEM — Z95.2 S/P TAVR (TRANSCATHETER AORTIC VALVE REPLACEMENT): Status: ACTIVE | Noted: 2017-11-03

## 2017-11-25 LAB — INR BLDC: 1.6

## 2017-11-27 NOTE — CONSULTS
[address] [facility]    PATIENT'S NAME: Ara Cortes   ATTENDING PHYSICIAN: Selene Puckett MD   CONSULTING PHYSICIAN: Marisa Sam MD   PATIENT ACCOUNT#:   [id number]    LOCATION:  [address] [place] [address]  MEDICAL RECORD #:   [id number]       DATE OF BIRTH Maciej Cristina MD  d: 10/23/2017 17:31:45  t: 11/27/2017 08:52:01  [facility] [id number]  /

## 2017-12-01 ENCOUNTER — HOSPITAL (OUTPATIENT)
Dept: OTHER | Age: 82
End: 2017-12-01
Attending: INTERNAL MEDICINE

## 2017-12-22 NOTE — PROCEDURES
[address] [facility] Cardiology  Transesophageal Echocardiogram Report    PREOPERATIVE DIAGNOSIS:   Severe aortic stenosis    POSTOPERATIVE DIAGNOSIS:  Transcatheter aortic valve replacement    PROCEDURE PERFORMED: Transesophageal echocardiogram with Doppl gradient 40 mmHg. Postprocedure, normally functioning Evolut R bioprosthetic valve with a mean gradient of 5 mmHg. Trace paravalvular aortic regurgitation (by VARC-2 criteria) was noted.     Ivelisse Jensen MD, [facility]  12/22/2017  10:54 AM

## 2017-12-28 LAB — INR BLDC: 2.8

## 2018-01-27 ENCOUNTER — HOSPITAL (OUTPATIENT)
Dept: OTHER | Age: 83
End: 2018-01-27
Attending: INTERNAL MEDICINE

## 2018-01-27 LAB — INR BLDC: 2.2

## 2018-02-20 ENCOUNTER — PRIOR ORIGINAL RECORDS (OUTPATIENT)
Dept: OTHER | Age: 83
End: 2018-02-20

## 2018-03-16 ENCOUNTER — HOSPITAL (OUTPATIENT)
Dept: OTHER | Age: 83
End: 2018-03-16
Attending: INTERNAL MEDICINE

## 2018-03-16 LAB — INR BLDC: 1.7

## 2018-04-01 ENCOUNTER — HOSPITAL (OUTPATIENT)
Dept: OTHER | Age: 83
End: 2018-04-01
Attending: INTERNAL MEDICINE

## 2018-04-03 ENCOUNTER — PRIOR ORIGINAL RECORDS (OUTPATIENT)
Dept: OTHER | Age: 83
End: 2018-04-03

## 2018-04-12 ENCOUNTER — HOSPITAL (OUTPATIENT)
Dept: OTHER | Age: 83
End: 2018-04-12
Attending: INTERNAL MEDICINE

## 2018-04-16 LAB — INR BLDC: 1.7

## 2018-05-01 ENCOUNTER — HOSPITAL (OUTPATIENT)
Dept: OTHER | Age: 83
End: 2018-05-01
Attending: INTERNAL MEDICINE

## 2018-05-18 LAB — INR BLDC: 1.8

## 2018-06-18 ENCOUNTER — HOSPITAL (OUTPATIENT)
Dept: OTHER | Age: 83
End: 2018-06-18
Attending: INTERNAL MEDICINE

## 2018-06-18 LAB
INR BLDC: NORMAL
INR PPP: 4.7
PROTHROMBIN TIME: 44.8 SECONDS (ref 9.7–11.8)
PROTHROMBIN TIME: ABNORMAL

## 2018-06-20 LAB — INR BLDC: 3.2

## 2018-06-25 LAB — INR BLDC: 1.4

## 2018-07-01 ENCOUNTER — HOSPITAL (OUTPATIENT)
Dept: OTHER | Age: 83
End: 2018-07-01
Attending: INTERNAL MEDICINE

## 2018-07-21 LAB
INR PPP: 2
PROTHROMBIN TIME: 19.4 SECONDS (ref 9.7–11.8)
PROTHROMBIN TIME: ABNORMAL

## 2018-08-01 ENCOUNTER — HOSPITAL (OUTPATIENT)
Dept: OTHER | Age: 83
End: 2018-08-01
Attending: INTERNAL MEDICINE

## 2018-08-20 LAB
INR PPP: 4.2
PROTHROMBIN TIME: 40 SECONDS (ref 9.7–11.8)
PROTHROMBIN TIME: ABNORMAL

## 2018-08-21 ENCOUNTER — PRIOR ORIGINAL RECORDS (OUTPATIENT)
Dept: OTHER | Age: 83
End: 2018-08-21

## 2018-08-27 ENCOUNTER — HOSPITAL (OUTPATIENT)
Dept: OTHER | Age: 83
End: 2018-08-27
Attending: FAMILY MEDICINE

## 2018-08-27 LAB
INR PPP: 2
PROTHROMBIN TIME: 19.6 SECONDS (ref 9.7–11.8)
PROTHROMBIN TIME: ABNORMAL

## 2018-09-11 ENCOUNTER — HOSPITAL (OUTPATIENT)
Dept: OTHER | Age: 83
End: 2018-09-11
Attending: INTERNAL MEDICINE

## 2018-09-11 LAB — INR BLDC: 2

## 2018-09-25 ENCOUNTER — HOSPITAL (OUTPATIENT)
Dept: OTHER | Age: 83
End: 2018-09-25
Attending: INTERNAL MEDICINE

## 2018-09-25 ENCOUNTER — HOSPITAL (OUTPATIENT)
Dept: OTHER | Age: 83
End: 2018-09-25
Attending: EMERGENCY MEDICINE

## 2018-09-25 LAB
AMORPH SED URNS QL MICRO: NORMAL
APPEARANCE UR: CLEAR
BILIRUB UR QL: NEGATIVE
CAOX CRY URNS QL MICRO: NORMAL
CHOLEST SERPL-MCNC: 133 MG/DL
CHOLEST/HDLC SERPL: 3.4 {RATIO}
COLOR UR: YELLOW
EPITH CASTS #/AREA URNS LPF: NORMAL /[LPF]
FATTY CASTS #/AREA URNS LPF: NORMAL /[LPF]
GLUCOSE UR-MCNC: NEGATIVE MG/DL
GRAN CASTS #/AREA URNS LPF: NORMAL /[LPF]
HDLC SERPL-MCNC: 39 MG/DL
HGB UR QL: NEGATIVE
HYALINE CASTS #/AREA URNS LPF: NORMAL /[LPF]
KETONES UR-MCNC: NEGATIVE MG/DL
LDLC SERPL CALC-MCNC: 66 MG/DL
LENGTH OF FAST TIME PATIENT: 12 HR
LEUKOCYTE ESTERASE UR QL STRIP: NEGATIVE
MICROSCOPIC (MT): NORMAL
MIXED CELL CASTS #/AREA URNS LPF: NORMAL /[LPF]
MUCOUS THREADS URNS QL MICRO: NORMAL
NITRITE UR QL: NEGATIVE
NONHDLC SERPL-MCNC: 94 MG/DL
PH UR: 5 UNIT (ref 5–7)
PROT UR QL: NEGATIVE MG/DL
RBC CASTS #/AREA URNS LPF: NORMAL /[LPF]
RENAL EPI CELLS #/AREA URNS HPF: NORMAL /[HPF]
SP GR UR: 1.01 (ref 1–1.03)
SPECIMEN SOURCE: NORMAL
SPERM URNS QL MICRO: NORMAL
T VAGINALIS URNS QL MICRO: NORMAL
TRI-PHOS CRY URNS QL MICRO: NORMAL
TRIGLYCERIDE (TRIGP): 141 MG/DL
URATE CRY URNS QL MICRO: NORMAL
URNS CMNT MICRO: NORMAL
UROBILINOGEN UR QL: 0.2 MG/DL (ref 0–1)
WAXY CASTS #/AREA URNS LPF: NORMAL /[LPF]
WBC CASTS #/AREA URNS LPF: NORMAL /[LPF]
YEAST HYPHAE URNS QL MICRO: NORMAL
YEAST URNS QL MICRO: NORMAL

## 2018-10-01 ENCOUNTER — HOSPITAL (OUTPATIENT)
Dept: OTHER | Age: 83
End: 2018-10-01
Attending: INTERNAL MEDICINE

## 2018-10-11 LAB — INR BLDC: 1.6

## 2018-10-25 LAB — INR BLDC: 1.9

## 2018-11-01 ENCOUNTER — HOSPITAL (OUTPATIENT)
Dept: OTHER | Age: 83
End: 2018-11-01
Attending: INTERNAL MEDICINE

## 2018-11-27 ENCOUNTER — LAB SERVICES (OUTPATIENT)
Dept: OTHER | Age: 83
End: 2018-11-27

## 2018-11-27 LAB
INR BLDC: 3
INR BLDC: 3
INR BLDC: NORMAL

## 2018-12-19 ENCOUNTER — HOSPITAL (OUTPATIENT)
Dept: OTHER | Age: 83
End: 2018-12-19
Attending: INTERNAL MEDICINE

## 2018-12-31 ENCOUNTER — PRIOR ORIGINAL RECORDS (OUTPATIENT)
Dept: OTHER | Age: 83
End: 2018-12-31

## 2019-01-04 ENCOUNTER — HOSPITAL (OUTPATIENT)
Dept: OTHER | Age: 84
End: 2019-01-04
Attending: INTERNAL MEDICINE

## 2019-01-04 LAB
INR BLDC: 2
INR BLDC: 2
INR BLDC: NORMAL

## 2019-02-01 ENCOUNTER — HOSPITAL (OUTPATIENT)
Dept: OTHER | Age: 84
End: 2019-02-01
Attending: INTERNAL MEDICINE

## 2019-02-01 LAB
INR BLDC: 2.2
INR BLDC: 2.2
INR BLDC: NORMAL

## 2019-02-19 ENCOUNTER — PRIOR ORIGINAL RECORDS (OUTPATIENT)
Dept: OTHER | Age: 84
End: 2019-02-19

## 2019-02-25 ENCOUNTER — PRIOR ORIGINAL RECORDS (OUTPATIENT)
Dept: OTHER | Age: 84
End: 2019-02-25

## 2019-03-01 ENCOUNTER — HOSPITAL (OUTPATIENT)
Dept: OTHER | Age: 84
End: 2019-03-01
Attending: INTERNAL MEDICINE

## 2019-03-01 LAB
INR BLDC: 1.7
INR BLDC: 1.7
INR BLDC: NORMAL

## 2019-03-04 ENCOUNTER — HOSPITAL (OUTPATIENT)
Dept: OTHER | Age: 84
End: 2019-03-04
Attending: INTERNAL MEDICINE

## 2019-03-08 ENCOUNTER — HOSPITAL (OUTPATIENT)
Dept: OTHER | Age: 84
End: 2019-03-08
Attending: INTERNAL MEDICINE

## 2019-03-29 LAB
INR BLDC: 2.2
INR BLDC: 2.2
INR BLDC: NORMAL

## 2019-04-29 ENCOUNTER — HOSPITAL (OUTPATIENT)
Dept: OTHER | Age: 84
End: 2019-04-29
Attending: INTERNAL MEDICINE

## 2019-04-29 LAB
INR BLDC: 2.1
INR BLDC: 2.1
INR BLDC: NORMAL

## 2019-05-01 ENCOUNTER — HOSPITAL (OUTPATIENT)
Dept: OTHER | Age: 84
End: 2019-05-01
Attending: INTERNAL MEDICINE

## 2019-05-30 LAB
INR BLDC: 2.4
INR BLDC: NORMAL

## 2019-06-01 ENCOUNTER — HOSPITAL (OUTPATIENT)
Dept: OTHER | Age: 84
End: 2019-06-01
Attending: INTERNAL MEDICINE

## 2019-06-28 ENCOUNTER — HOSPITAL (OUTPATIENT)
Dept: OTHER | Age: 84
End: 2019-06-28

## 2019-06-28 LAB
ALBUMIN SERPL-MCNC: 3.7 G/DL (ref 3.6–5.1)
ALP SERPL-CCNC: 121 UNITS/L (ref 45–117)
ALT SERPL-CCNC: 23 UNITS/L
ANALYZER ANC (IANC): ABNORMAL
ANION GAP SERPL CALC-SCNC: 12 MMOL/L (ref 10–20)
APTT PPP: 32 SEC (ref 22–32)
APTT PPP: NORMAL S
APTT PPP: NORMAL S
AST SERPL-CCNC: 23 UNITS/L
AST SERPL-CCNC: ABNORMAL U/L
BASOPHILS # BLD: 0 K/MCL (ref 0–0.3)
BASOPHILS NFR BLD: 0 %
BILIRUB CONJ SERPL-MCNC: 0.1 MG/DL (ref 0–0.2)
BILIRUB CONJ SERPL-MCNC: ABNORMAL MG/DL
BILIRUB SERPL-MCNC: 0.5 MG/DL (ref 0.2–1)
BUN SERPL-MCNC: 27 MG/DL (ref 6–20)
BUN/CREAT SERPL: 22 (ref 7–25)
CALCIUM SERPL-MCNC: 8.5 MG/DL (ref 8.4–10.2)
CHLORIDE SERPL-SCNC: 113 MMOL/L (ref 98–107)
CO2 SERPL-SCNC: 20 MMOL/L (ref 21–32)
CREAT SERPL-MCNC: 1.25 MG/DL (ref 0.67–1.17)
DIFFERENTIAL METHOD BLD: ABNORMAL
EOSINOPHIL # BLD: 0 K/MCL (ref 0.1–0.5)
EOSINOPHIL NFR BLD: 0 %
ERYTHROCYTE [DISTWIDTH] IN BLOOD: 14.6 % (ref 11–15)
GLUCOSE SERPL-MCNC: 207 MG/DL (ref 65–99)
HCT VFR BLD CALC: 37.1 % (ref 39–51)
HGB BLD-MCNC: 12.5 G/DL (ref 13–17)
IMM GRANULOCYTES # BLD AUTO: 0 K/MCL (ref 0–0.2)
IMM GRANULOCYTES NFR BLD: 0 %
INR PPP: 1.9
INR PPP: ABNORMAL
LIPASE SERPL-CCNC: 182 UNITS/L (ref 73–393)
LYMPHOCYTES # BLD: 0.6 K/MCL (ref 1–4)
LYMPHOCYTES NFR BLD: 7 %
MAGNESIUM SERPL-MCNC: 2.2 MG/DL (ref 1.7–2.4)
MAGNESIUM SERPL-MCNC: NORMAL MG/DL
MCH RBC QN AUTO: 29.7 PG (ref 26–34)
MCHC RBC AUTO-ENTMCNC: 33.7 G/DL (ref 32–36.5)
MCV RBC AUTO: 88.1 FL (ref 78–100)
MONOCYTES # BLD: 0.7 K/MCL (ref 0.3–0.9)
MONOCYTES NFR BLD: 9 %
NEUTROPHILS # BLD: 6.6 K/MCL (ref 1.8–7.7)
NEUTROPHILS NFR BLD: 84 %
NEUTS SEG NFR BLD: ABNORMAL %
NRBC (NRBCRE): 0 /100 WBC
PLATELET # BLD: 149 K/MCL (ref 140–450)
POTASSIUM SERPL-SCNC: 4.5 MMOL/L (ref 3.4–5.1)
POTASSIUM SERPL-SCNC: ABNORMAL MMOL/L
PROT SERPL-MCNC: 6.3 G/DL (ref 6.4–8.2)
PROTHROMBIN TIME (PRT2): ABNORMAL
PROTHROMBIN TIME: 19 SEC (ref 9.7–11.8)
RBC # BLD: 4.21 MIL/MCL (ref 4.5–5.9)
SODIUM SERPL-SCNC: 141 MMOL/L (ref 135–145)
WBC # BLD: 8 K/MCL (ref 4.2–11)

## 2019-06-29 LAB
INR PPP: 2.6
INR PPP: ABNORMAL
PROTHROMBIN TIME (PRT2): ABNORMAL
PROTHROMBIN TIME: 25.4 SEC (ref 9.7–11.8)

## 2019-08-07 PROBLEM — Z51.81 MONITORING FOR LONG-TERM ANTICOAGULANT USE: Status: ACTIVE | Noted: 2019-08-07

## 2019-08-07 PROBLEM — Q21.1 PATENT FORAMEN OVALE: Status: ACTIVE | Noted: 2019-08-07

## 2019-08-07 PROBLEM — Z79.01 MONITORING FOR LONG-TERM ANTICOAGULANT USE: Status: ACTIVE | Noted: 2019-08-07

## 2019-08-07 PROBLEM — G45.9 TRANSIENT ISCHEMIC ATTACK: Status: ACTIVE | Noted: 2019-08-07

## 2019-08-20 ENCOUNTER — PRIOR ORIGINAL RECORDS (OUTPATIENT)
Dept: OTHER | Age: 84
End: 2019-08-20

## 2019-11-04 ENCOUNTER — PRIOR ORIGINAL RECORDS (OUTPATIENT)
Dept: OTHER | Age: 84
End: 2019-11-04

## 2019-11-05 ENCOUNTER — HOSPITAL (OUTPATIENT)
Dept: OTHER | Age: 84
End: 2019-11-05
Attending: INTERNAL MEDICINE

## 2019-11-25 ENCOUNTER — HOSPITAL (OUTPATIENT)
Dept: OTHER | Age: 84
End: 2019-11-25
Attending: INTERNAL MEDICINE

## 2019-12-31 ENCOUNTER — PRIOR ORIGINAL RECORDS (OUTPATIENT)
Dept: OTHER | Age: 84
End: 2019-12-31

## 2020-02-18 ENCOUNTER — PRIOR ORIGINAL RECORDS (OUTPATIENT)
Dept: OTHER | Age: 85
End: 2020-02-18

## 2020-02-18 PROCEDURE — 99213 OFFICE O/P EST LOW 20 MIN: CPT | Performed by: INTERNAL MEDICINE

## 2020-04-01 ENCOUNTER — HOSPITAL (OUTPATIENT)
Dept: OTHER | Age: 85
End: 2020-04-01

## 2020-04-06 PROBLEM — I44.2 ATRIOVENTRICULAR BLOCK, COMPLETE (HCC): Status: ACTIVE | Noted: 2020-04-06

## 2020-05-01 ENCOUNTER — HOSPITAL (OUTPATIENT)
Dept: OTHER | Age: 85
End: 2020-05-01

## 2020-06-01 ENCOUNTER — HOSPITAL (OUTPATIENT)
Dept: OTHER | Age: 85
End: 2020-06-01

## 2020-06-23 ENCOUNTER — PRIOR ORIGINAL RECORDS (OUTPATIENT)
Dept: OTHER | Age: 85
End: 2020-06-23

## 2020-06-23 PROCEDURE — 99212 OFFICE O/P EST SF 10 MIN: CPT | Performed by: INTERNAL MEDICINE

## 2020-07-01 ENCOUNTER — HOSPITAL (OUTPATIENT)
Dept: OTHER | Age: 85
End: 2020-07-01
Attending: INTERNAL MEDICINE

## 2020-08-01 ENCOUNTER — HOSPITAL (OUTPATIENT)
Dept: OTHER | Age: 85
End: 2020-08-01
Attending: INTERNAL MEDICINE

## 2020-08-13 ENCOUNTER — HOSPITAL (OUTPATIENT)
Dept: OTHER | Age: 85
End: 2020-08-13
Attending: INTERNAL MEDICINE

## 2020-08-13 ENCOUNTER — PRIOR ORIGINAL RECORDS (OUTPATIENT)
Dept: OTHER | Age: 85
End: 2020-08-13

## 2020-08-13 PROCEDURE — 99212 OFFICE O/P EST SF 10 MIN: CPT | Performed by: INTERNAL MEDICINE

## 2020-09-01 ENCOUNTER — HOSPITAL (OUTPATIENT)
Dept: OTHER | Age: 85
End: 2020-09-01
Attending: INTERNAL MEDICINE

## 2020-10-09 LAB
ALBUMIN/GLOB SERPL: 2 (CALC) (ref 1–2.5)
ALBUMIN/GLOB SERPL: 2 (CALC) (ref 1–2.5)
ALBUMIN/GLOB SERPL: 2.1 (CALC) (ref 1–2.5)
ALBUMIN/GLOB SERPL: 2.2 (CALC) (ref 1–2.5)
ALBUMIN/GLOB SERPL: 2.3 (CALC)
ALBUMIN/GLOB SERPL: 2.3 (CALC) (ref 1–2.5)
ALBUMIN: 3.9 G/DL
ALBUMIN: 3.9 G/DL (ref 3.6–5.1)
ALBUMIN: 4 G/DL (ref 3.6–5.1)
ALBUMIN: 4.1 G/DL (ref 3.6–5.1)
ALKALINE PHOSPHATASE: 114 UNIT/L (ref 40–115)
ALKALINE PHOSPHATASE: 115 UNIT/L
ALKALINE PHOSPHATASE: 119 UNIT/L (ref 40–115)
ALKALINE PHOSPHATASE: 139 UNIT/L (ref 40–115)
ALKALINE PHOSPHATASE: 144 UNIT/L (ref 40–115)
ALKALINE PHOSPHATASE: 99 UNIT/L (ref 40–115)
ALT: 10 UNIT/L
ALT: 10 UNIT/L (ref 9–46)
ALT: 10 UNIT/L (ref 9–46)
ALT: 11 UNIT/L (ref 9–46)
ALT: 12 UNIT/L (ref 9–46)
ALT: 9 UNIT/L (ref 9–46)
AST: 13 UNIT/L (ref 10–35)
AST: 14 UNIT/L
AST: 14 UNIT/L (ref 10–35)
AST: 14 UNIT/L (ref 10–35)
AST: 15 UNIT/L (ref 10–35)
AST: 19 UNIT/L (ref 10–35)
BASO%: 0.1 %
BASO%: 0.2 %
BASO%: 0.3 %
BASO%: 0.4 %
BASO%: 0.4 %
BASO: 0 10^3/UL
BILIRUBIN, TOTAL: 0.4 MG/DL (ref 0.2–1.2)
BILIRUBIN, TOTAL: 0.5 MG/DL
BILIRUBIN, TOTAL: 0.6 MG/DL (ref 0.2–1.2)
BILIRUBIN, TOTAL: 0.6 MG/DL (ref 0.2–1.2)
BUN/CREATININE RATIO: 17 (CALC)
BUN/CREATININE RATIO: 18 (CALC) (ref 6–22)
BUN/CREATININE RATIO: 18 (CALC) (ref 6–22)
BUN/CREATININE RATIO: 19 (CALC) (ref 6–22)
BUN/CREATININE RATIO: 20 (CALC) (ref 6–22)
CALCIUM: 8.6 MG/DL (ref 8.6–10.3)
CALCIUM: 8.7 MG/DL
CALCIUM: 8.9 MG/DL (ref 8.6–10.3)
CALCIUM: 8.9 MG/DL (ref 8.6–10.3)
CALCIUM: 9 MG/DL (ref 8.6–10.3)
CARBON DIOXIDE: 14 MMOL/L (ref 20–31)
CARBON DIOXIDE: 14 MMOL/L (ref 20–32)
CARBON DIOXIDE: 16 MMOL/L (ref 20–32)
CARBON DIOXIDE: 17 MMOL/L (ref 20–32)
CARBON DIOXIDE: 19 MMOL/L (ref 20–31)
CARBON DIOXIDE: 20 MMOL/L
CARBON DIOXIDE: 20 MMOL/L (ref 20–31)
CHLORIDE: 109 MMOL/L (ref 98–110)
CHLORIDE: 110 MMOL/L
CHLORIDE: 110 MMOL/L (ref 98–110)
CHLORIDE: 111 MMOL/L (ref 98–110)
CHLORIDE: 111 MMOL/L (ref 98–110)
CHLORIDE: 114 MMOL/L (ref 98–110)
CHLORIDE: 114 MMOL/L (ref 98–110)
CRCL (C&G) (MOSAIQ HL): 30.85 ML/MIN
CRCL (C&G) (MOSAIQ HL): 31.16 ML/MIN
CRCL (C&G) (MOSAIQ HL): 33.32 ML/MIN
CRCL (C&G) (MOSAIQ HL): 33.43 ML/MIN
CRCL (C&G) (MOSAIQ HL): 33.54 ML/MIN
CRCL (C&G) (MOSAIQ HL): 34.45 ML/MIN
CRCL (C&G) (MOSAIQ HL): 35.5 ML/MIN
CREATININE CLEARANCE (MOSAIQ HL): 25.9 ML/MIN
CREATININE CLEARANCE (MOSAIQ HL): 27.3 ML/MIN
CREATININE CLEARANCE (MOSAIQ HL): 27.7 ML/MIN
CREATININE CLEARANCE (MOSAIQ HL): 28.9 ML/MIN
CREATININE CLEARANCE (MOSAIQ HL): 29.6 ML/MIN
CREATININE CLEARANCE (MOSAIQ HL): 29.6 ML/MIN
CREATININE CLEARANCE (MOSAIQ HL): 30.3 ML/MIN
CREATININE: 1.28 MG/DL
CREATININE: 1.31 MG/DL (ref 0.7–1.11)
CREATININE: 1.34 MG/DL (ref 0.7–1.11)
CREATININE: 1.37 MG/DL (ref 0.7–1.11)
CREATININE: 1.37 MG/DL (ref 0.7–1.11)
CREATININE: 1.48 MG/DL (ref 0.7–1.11)
CREATININE: 1.53 MG/DL (ref 0.7–1.11)
EGFR AFRICAN AMERICAN: 45 ML/MIN/1.73M2
EGFR AFRICAN AMERICAN: 47 ML/MIN/1.73M2
EGFR AFRICAN AMERICAN: 50 ML/MIN/1.73M2
EGFR AFRICAN AMERICAN: 51 ML/MIN/1.73M2
EGFR AFRICAN AMERICAN: 53 ML/MIN/1.73M2
EGFR AFRICAN AMERICAN: 54 ML/MIN/1.73M2
EGFR AFRICAN AMERICAN: 55 ML/MIN/1.73M2
EGFR NON-AFR. AMERICAN: 39 ML/MIN/1.73M2
EGFR NON-AFR. AMERICAN: 40 ML/MIN/1.73M2
EGFR NON-AFR. AMERICAN: 44 ML/MIN/1.73M2
EGFR NON-AFR. AMERICAN: 44 ML/MIN/1.73M2
EGFR NON-AFR. AMERICAN: 45 ML/MIN/1.73M2
EGFR NON-AFR. AMERICAN: 46 ML/MIN/1.73M2
EGFR NON-AFR. AMERICAN: 48 ML/MIN/1.73M2
EOS%: 1.8 %
EOS%: 1.8 %
EOS%: 2 %
EOS%: 2.7 %
EOS%: 2.8 %
EOS%: 2.9 %
EOS%: 3.1 %
EOS: 0.1 10^3/UL
EOS: 0.2 10^3/UL
GLOBULIN: 1.7 G/DL (CALC)
GLOBULIN: 1.7 G/DL (CALC) (ref 1.9–3.7)
GLOBULIN: 1.9 G/DL (CALC) (ref 1.9–3.7)
GLOBULIN: 1.9 G/DL (CALC) (ref 1.9–3.7)
GLOBULIN: 2 G/DL (CALC) (ref 1.9–3.7)
GLOBULIN: 2 G/DL (CALC) (ref 1.9–3.7)
GLUCOSE: 102 MG/DL (ref 65–99)
GLUCOSE: 103 MG/DL (ref 65–99)
GLUCOSE: 104 MG/DL (ref 65–99)
GLUCOSE: 108 MG/DL (ref 65–99)
GLUCOSE: 125 MG/DL (ref 65–99)
GLUCOSE: 151 MG/DL (ref 65–99)
GLUCOSE: 87 MG/DL
HCT: 34.3 % (ref 38–54)
HCT: 37.2 % (ref 38–54)
HCT: 37.2 % (ref 38–54)
HCT: 37.8 % (ref 38–54)
HCT: 38.6 % (ref 38–54)
HCT: 39 % (ref 38–54)
HCT: 39 % (ref 38–54)
HGB: 11.4 G/DL (ref 12–18)
HGB: 12.3 G/DL (ref 12–18)
HGB: 12.3 G/DL (ref 12–18)
HGB: 12.6 G/DL (ref 12–18)
HGB: 12.6 G/DL (ref 12–18)
HGB: 12.8 G/DL (ref 12–18)
HGB: 13.2 G/DL (ref 12–18)
LYMPH%: 10.7 % (ref 12–44)
LYMPH%: 12.7 % (ref 12–44)
LYMPH%: 13.1 % (ref 12–44)
LYMPH%: 14.5 % (ref 12–44)
LYMPH%: 15.5 % (ref 12–44)
LYMPH%: 15.8 % (ref 12–44)
LYMPH%: 16 % (ref 12–44)
LYMPH: 0.7 10^3/UL (ref 0.8–2.8)
LYMPH: 0.7 10^3/UL (ref 0.8–2.8)
LYMPH: 0.8 10^3/UL (ref 0.8–2.8)
LYMPH: 0.9 10^3/UL (ref 0.8–2.8)
LYMPH: 1 10^3/UL (ref 0.8–2.8)
MCH: 28.1 PG (ref 26–33)
MCH: 28.5 PG (ref 26–33)
MCH: 28.8 PG (ref 26–33)
MCH: 29 PG (ref 26–33)
MCH: 29 PG (ref 26–33)
MCH: 29.1 PG (ref 26–33)
MCH: 29.2 PG (ref 26–33)
MCHC: 32.3 G/DL (ref 31–36)
MCHC: 32.5 G/DL (ref 31–36)
MCHC: 33.1 G/DL (ref 31–36)
MCHC: 33.2 G/DL (ref 31–36)
MCHC: 33.2 G/DL (ref 31–36)
MCHC: 33.8 G/DL (ref 31–36)
MCHC: 33.9 G/DL (ref 31–36)
MCV: 84.7 FML (ref 82–100)
MCV: 85.7 FML (ref 82–100)
MCV: 86.1 FML (ref 82–100)
MCV: 86.3 FML (ref 82–100)
MCV: 86.7 FML (ref 82–100)
MCV: 89.4 FML (ref 82–100)
MCV: 89.9 FML (ref 82–100)
MONO%: 11.9 % (ref 2–12)
MONO%: 13 % (ref 2–12)
MONO%: 13.9 % (ref 2–12)
MONO%: 14 % (ref 2–12)
MONO%: 14 % (ref 2–12)
MONO%: 14.8 % (ref 2–12)
MONO%: 14.9 % (ref 2–12)
MONO: 0.7 10^3/UL (ref 0.2–1)
MONO: 0.8 10^3/UL (ref 0.2–1)
MONO: 0.9 10^3/UL (ref 0.2–1)
MPV: 8.8 FML (ref 8.6–11.7)
MPV: 8.8 FML (ref 8.6–11.7)
MPV: 8.9 FML (ref 8.6–11.7)
MPV: 9.1 FML (ref 8.6–11.7)
MPV: 9.2 FML (ref 8.6–11.7)
MPV: 9.4 FML (ref 8.6–11.7)
MPV: 9.6 FML (ref 8.6–11.7)
NEUT%: 65.9 % (ref 47–76)
NEUT%: 66.4 % (ref 47–76)
NEUT%: 68.2 % (ref 47–76)
NEUT%: 68.2 % (ref 47–76)
NEUT%: 70.7 % (ref 47–76)
NEUT%: 71.4 % (ref 47–76)
NEUT%: 75.5 % (ref 47–76)
NEUT: 3.1 10^3/UL (ref 1.5–7.1)
NEUT: 3.7 10^3/UL (ref 1.5–7.1)
NEUT: 3.8 10^3/UL (ref 1.5–7.1)
NEUT: 4.2 10^3/UL (ref 1.5–7.1)
NEUT: 4.3 10^3/UL (ref 1.5–7.1)
NEUT: 4.3 10^3/UL (ref 1.5–7.1)
NEUT: 5.1 10^3/UL (ref 1.5–7.1)
PLT: 157 10^3/UL (ref 150–375)
PLT: 157 10^3/UL (ref 150–375)
PLT: 160 10^3/UL (ref 150–375)
PLT: 162 10^3/UL (ref 150–375)
PLT: 162 10^3/UL (ref 150–375)
PLT: 168 10^3/UL (ref 150–375)
PLT: 181 10^3/UL (ref 150–375)
POTASSIUM: 4.4 MMOL/L
POTASSIUM: 4.4 MMOL/L (ref 3.5–5.3)
POTASSIUM: 4.4 MMOL/L (ref 3.5–5.3)
POTASSIUM: 4.5 MMOL/L (ref 3.5–5.3)
POTASSIUM: 4.7 MMOL/L (ref 3.5–5.3)
POTASSIUM: 4.8 MMOL/L (ref 3.5–5.3)
POTASSIUM: 4.8 MMOL/L (ref 3.5–5.3)
PROTEIN, TOTAL: 5.6 G/DL
PROTEIN, TOTAL: 5.6 G/DL (ref 6.1–8.1)
PROTEIN, TOTAL: 5.9 G/DL (ref 6.1–8.1)
PROTEIN, TOTAL: 6 G/DL (ref 6.1–8.1)
PSA, TOTAL: 1 NG/ML
PSA, TOTAL: 1.2 NG/ML
PSA, TOTAL: 1.3 NG/ML
PSA, TOTAL: 1.6 NG/ML
PSA, TOTAL: 1.7 NG/ML
PSA: 1.3 MG/DL
RBC: 4.05 10^6/UL (ref 4.2–6.2)
RBC: 4.23 10^6/UL (ref 4.2–6.2)
RBC: 4.31 10^6/UL (ref 4.2–6.2)
RBC: 4.32 10^6/UL (ref 4.2–6.2)
RBC: 4.34 10^6/UL (ref 4.2–6.2)
RBC: 4.45 10^6/UL (ref 4.2–6.2)
RBC: 4.55 10^6/UL (ref 4.2–6.2)
RDW-CV: 14.8 %
RDW-CV: 15 %
RDW-CV: 15.1 %
RDW-CV: 15.2 %
RDW-CV: 15.3 %
RDW-CV: 15.7 %
RDW-CV: 16.3 %
RDW-SD: 46.3 FML (ref 36–50)
RDW-SD: 46.6 FML (ref 36–50)
RDW-SD: 46.8 FML (ref 36–50)
RDW-SD: 47.8 FML (ref 36–50)
RDW-SD: 48.2 FML (ref 36–50)
RDW-SD: 48.8 FML (ref 36–50)
RDW-SD: 49 FML (ref 36–50)
SODIUM: 137 MMOL/L (ref 135–146)
SODIUM: 138 MMOL/L
SODIUM: 138 MMOL/L (ref 135–146)
SODIUM: 139 MMOL/L (ref 135–146)
SODIUM: 141 MMOL/L (ref 135–146)
SODIUM: 142 MMOL/L (ref 135–146)
SODIUM: 145 MMOL/L (ref 135–146)
UREA NITROGEN (BUN): 22 MG/DL
UREA NITROGEN (BUN): 25 MG/DL (ref 7–25)
UREA NITROGEN (BUN): 28 MG/DL (ref 7–25)
WBC: 4.6 10^3/UL (ref 4.3–11)
WBC: 5.4 10^3/UL (ref 4.3–11)
WBC: 5.8 10^3/UL (ref 4.3–11)
WBC: 5.9 10^3/UL (ref 4.3–11)
WBC: 6.1 10^3/UL (ref 4.3–11)
WBC: 6.3 10^3/UL (ref 4.3–11)
WBC: 6.7 10^3/UL (ref 4.3–11)

## 2020-10-11 VITALS — SYSTOLIC BLOOD PRESSURE: 116 MMHG | WEIGHT: 150.6 LBS | DIASTOLIC BLOOD PRESSURE: 64 MMHG | BODY MASS INDEX: 25.06 KG/M2

## 2020-10-11 VITALS
WEIGHT: 151 LBS | SYSTOLIC BLOOD PRESSURE: 126 MMHG | BODY MASS INDEX: 29.64 KG/M2 | HEIGHT: 60 IN | DIASTOLIC BLOOD PRESSURE: 68 MMHG

## 2020-10-11 VITALS
HEIGHT: 60 IN | SYSTOLIC BLOOD PRESSURE: 138 MMHG | DIASTOLIC BLOOD PRESSURE: 70 MMHG | BODY MASS INDEX: 30.78 KG/M2 | WEIGHT: 156.79 LBS

## 2020-10-11 VITALS — DIASTOLIC BLOOD PRESSURE: 72 MMHG | BODY MASS INDEX: 25.06 KG/M2 | WEIGHT: 150.6 LBS | SYSTOLIC BLOOD PRESSURE: 126 MMHG

## 2020-10-11 VITALS
DIASTOLIC BLOOD PRESSURE: 64 MMHG | BODY MASS INDEX: 32.12 KG/M2 | HEIGHT: 60 IN | WEIGHT: 163.6 LBS | SYSTOLIC BLOOD PRESSURE: 102 MMHG

## 2020-10-11 VITALS — SYSTOLIC BLOOD PRESSURE: 130 MMHG | WEIGHT: 151.81 LBS | DIASTOLIC BLOOD PRESSURE: 74 MMHG

## 2020-10-11 VITALS
WEIGHT: 159.39 LBS | DIASTOLIC BLOOD PRESSURE: 70 MMHG | HEIGHT: 60 IN | BODY MASS INDEX: 31.29 KG/M2 | SYSTOLIC BLOOD PRESSURE: 110 MMHG

## 2020-10-13 VITALS — WEIGHT: 154.2 LBS | BODY MASS INDEX: 31.14 KG/M2 | DIASTOLIC BLOOD PRESSURE: 71 MMHG | SYSTOLIC BLOOD PRESSURE: 119 MMHG

## 2020-10-13 VITALS — SYSTOLIC BLOOD PRESSURE: 148 MMHG | BODY MASS INDEX: 31.1 KG/M2 | WEIGHT: 154 LBS | DIASTOLIC BLOOD PRESSURE: 66 MMHG

## 2020-10-13 LAB
BASO%: 0.2 %
BASO%: 0.4 %
BASO: 0 10^3/UL
BASO: 0 10^3/UL
EOS%: 2 %
EOS%: 3 %
EOS: 0.1 10^3/UL
EOS: 0.2 10^3/UL
HCT: 36.2 % (ref 38–54)
HCT: 37.4 % (ref 38–54)
HGB: 12.4 G/DL (ref 12–18)
HGB: 12.5 G/DL (ref 12–18)
LYMPH%: 15.6 % (ref 12–44)
LYMPH%: 16.6 % (ref 12–44)
LYMPH: 0.8 10^3/UL (ref 0.8–2.8)
LYMPH: 0.9 10^3/UL (ref 0.8–2.8)
MCH: 29.2 PG (ref 26–33)
MCH: 30.1 PG (ref 26–33)
MCHC: 33.4 G/DL (ref 31–36)
MCHC: 34.3 G/DL (ref 31–36)
MCV: 87.4 FML (ref 82–100)
MCV: 87.9 FML (ref 82–100)
MONO%: 13.4 % (ref 2–12)
MONO%: 14.4 % (ref 2–12)
MONO: 0.7 10^3/UL (ref 0.2–1)
MONO: 0.7 10^3/UL (ref 0.2–1)
MPV: 10.4 FML (ref 8.6–11.7)
MPV: 9 FML (ref 8.6–11.7)
NEUT%: 65.6 % (ref 47–76)
NEUT%: 68.8 % (ref 47–76)
NEUT: 3.3 10^3/UL (ref 1.5–7.1)
NEUT: 3.8 10^3/UL (ref 1.5–7.1)
PLT: 137 10^3/UL (ref 150–375)
PLT: 158 10^3/UL (ref 150–375)
RBC: 4.12 10^6/UL (ref 4.2–6.2)
RBC: 4.28 10^6/UL (ref 4.2–6.2)
RDW-CV: 15.2 %
RDW-CV: 15.3 %
RDW-SD: 48 FML (ref 36–50)
RDW-SD: 48.2 FML (ref 36–50)
WBC: 5 10^3/UL (ref 4.3–11)
WBC: 5.5 10^3/UL (ref 4.3–11)

## 2020-10-22 ENCOUNTER — HOSPITAL ENCOUNTER (OUTPATIENT)
Dept: ULTRASOUND IMAGING | Age: 85
Discharge: HOME | End: 2020-10-22
Attending: INTERNAL MEDICINE

## 2020-10-22 DIAGNOSIS — N42.0 CALCULUS OF PROSTATE: ICD-10-CM

## 2020-10-22 DIAGNOSIS — N18.30 CHRONIC KIDNEY DISEASE, STAGE III (MODERATE) (CMD): ICD-10-CM

## 2020-10-22 PROCEDURE — 76770 US EXAM ABDO BACK WALL COMP: CPT

## 2020-12-15 ENCOUNTER — OFFICE VISIT (OUTPATIENT)
Dept: HEMATOLOGY/ONCOLOGY | Age: 85
End: 2020-12-15
Attending: INTERNAL MEDICINE

## 2020-12-15 ENCOUNTER — HOSPITAL ENCOUNTER (OUTPATIENT)
Dept: INFUSION THERAPY | Age: 85
Discharge: HOME | End: 2020-12-15
Attending: INTERNAL MEDICINE

## 2020-12-15 VITALS
SYSTOLIC BLOOD PRESSURE: 133 MMHG | BODY MASS INDEX: 31.1 KG/M2 | WEIGHT: 154 LBS | HEART RATE: 74 BPM | DIASTOLIC BLOOD PRESSURE: 64 MMHG

## 2020-12-15 DIAGNOSIS — C61 PROSTATE CANCER (CMD): ICD-10-CM

## 2020-12-15 DIAGNOSIS — C61 PROSTATE CANCER (CMD): Primary | ICD-10-CM

## 2020-12-15 PROBLEM — Z85.118 HISTORY OF LUNG CANCER: Status: ACTIVE | Noted: 2020-12-15

## 2020-12-15 PROBLEM — Z85.79 HISTORY OF MYCOSIS FUNGOIDES: Status: ACTIVE | Noted: 2020-12-15

## 2020-12-15 PROBLEM — C79.51 SECONDARY MALIGNANT NEOPLASM OF BONE (CMD): Status: ACTIVE | Noted: 2020-12-15

## 2020-12-15 LAB
ALBUMIN SERPL-MCNC: 3.5 G/DL (ref 3.6–5.1)
ALBUMIN/GLOB SERPL: 1.2 {RATIO} (ref 1–2.4)
ALP SERPL-CCNC: 137 UNITS/L (ref 45–117)
ALT SERPL-CCNC: 16 UNITS/L
ANION GAP SERPL CALC-SCNC: 9 MMOL/L (ref 10–20)
AST SERPL-CCNC: 14 UNITS/L
BASOPHILS # BLD: 0 K/MCL (ref 0–0.3)
BASOPHILS NFR BLD: 1 %
BILIRUB SERPL-MCNC: 0.4 MG/DL (ref 0.2–1)
BUN SERPL-MCNC: 32 MG/DL (ref 6–20)
BUN/CREAT SERPL: 22 (ref 7–25)
CALCIUM SERPL-MCNC: 8.5 MG/DL (ref 8.4–10.2)
CHLORIDE SERPL-SCNC: 113 MMOL/L (ref 98–107)
CO2 SERPL-SCNC: 23 MMOL/L (ref 21–32)
CREAT SERPL-MCNC: 1.43 MG/DL (ref 0.67–1.17)
DEPRECATED RDW RBC: 48.5 FL (ref 39–50)
EOSINOPHIL # BLD: 0.3 K/MCL (ref 0–0.5)
EOSINOPHIL NFR BLD: 5 %
ERYTHROCYTE [DISTWIDTH] IN BLOOD: 14.6 % (ref 11–15)
FASTING DURATION TIME PATIENT: ABNORMAL H
GFR SERPLBLD BASED ON 1.73 SQ M-ARVRAT: 41 ML/MIN/1.73M2
GLOBULIN SER-MCNC: 2.9 G/DL (ref 2–4)
GLUCOSE SERPL-MCNC: 128 MG/DL (ref 65–99)
HCT VFR BLD CALC: 35.9 % (ref 39–51)
HGB BLD-MCNC: 11.6 G/DL (ref 13–17)
IMM GRANULOCYTES # BLD AUTO: 0 K/MCL (ref 0–0.2)
IMM GRANULOCYTES # BLD: 1 %
LYMPHOCYTES # BLD: 0.7 K/MCL (ref 1–4)
LYMPHOCYTES NFR BLD: 13 %
MCH RBC QN AUTO: 29.4 PG (ref 26–34)
MCHC RBC AUTO-ENTMCNC: 32.3 G/DL (ref 32–36.5)
MCV RBC AUTO: 90.9 FL (ref 78–100)
MONOCYTES # BLD: 0.7 K/MCL (ref 0.3–0.9)
MONOCYTES NFR BLD: 14 %
NEUTROPHILS # BLD: 3.6 K/MCL (ref 1.8–7.7)
NEUTROPHILS NFR BLD: 66 %
NRBC BLD MANUAL-RTO: 0 /100 WBC
PLATELET # BLD AUTO: 172 K/MCL (ref 140–450)
POTASSIUM SERPL-SCNC: 4.3 MMOL/L (ref 3.4–5.1)
PROT SERPL-MCNC: 6.4 G/DL (ref 6.4–8.2)
PSA SERPL-MCNC: 3.59 NG/ML
RBC # BLD: 3.95 MIL/MCL (ref 4.5–5.9)
SODIUM SERPL-SCNC: 141 MMOL/L (ref 135–145)
WBC # BLD: 5.3 K/MCL (ref 4.2–11)

## 2020-12-15 PROCEDURE — 85025 COMPLETE CBC W/AUTO DIFF WBC: CPT | Performed by: INTERNAL MEDICINE

## 2020-12-15 PROCEDURE — 80053 COMPREHEN METABOLIC PANEL: CPT | Performed by: INTERNAL MEDICINE

## 2020-12-15 PROCEDURE — 99213 OFFICE O/P EST LOW 20 MIN: CPT | Performed by: INTERNAL MEDICINE

## 2020-12-15 PROCEDURE — 36415 COLL VENOUS BLD VENIPUNCTURE: CPT | Performed by: INTERNAL MEDICINE

## 2020-12-15 PROCEDURE — 84153 ASSAY OF PSA TOTAL: CPT | Performed by: INTERNAL MEDICINE

## 2020-12-15 ASSESSMENT — PATIENT HEALTH QUESTIONNAIRE - PHQ9
SUM OF ALL RESPONSES TO PHQ9 QUESTIONS 1 AND 2: 0
CLINICAL INTERPRETATION OF PHQ2 SCORE: NO FURTHER SCREENING NEEDED
CLINICAL INTERPRETATION OF PHQ9 SCORE: NO FURTHER SCREENING NEEDED
1. LITTLE INTEREST OR PLEASURE IN DOING THINGS: NOT AT ALL
SUM OF ALL RESPONSES TO PHQ9 QUESTIONS 1 AND 2: 0
2. FEELING DOWN, DEPRESSED OR HOPELESS: NOT AT ALL

## 2020-12-15 ASSESSMENT — ENCOUNTER SYMPTOMS
ALLERGIC/IMMUNOLOGIC NEGATIVE: 1
ENDOCRINE NEGATIVE: 1
EYES NEGATIVE: 1
HEMATOLOGIC/LYMPHATIC NEGATIVE: 1
PSYCHIATRIC NEGATIVE: 1
NEUROLOGICAL NEGATIVE: 1
GASTROINTESTINAL NEGATIVE: 1

## 2020-12-31 ENCOUNTER — PRIOR ORIGINAL RECORDS (OUTPATIENT)
Dept: OTHER | Age: 85
End: 2020-12-31

## 2020-12-31 ENCOUNTER — TELEPHONE (OUTPATIENT)
Dept: HEMATOLOGY/ONCOLOGY | Age: 85
End: 2020-12-31

## 2021-02-18 ENCOUNTER — TELEPHONE (OUTPATIENT)
Dept: HEMATOLOGY/ONCOLOGY | Age: 86
End: 2021-02-18

## 2021-03-01 DIAGNOSIS — G62.9 NEUROPATHY: Primary | ICD-10-CM

## 2021-03-14 ENCOUNTER — HOSPITAL ENCOUNTER (EMERGENCY)
Age: 86
Discharge: HOME | End: 2021-03-14
Attending: EMERGENCY MEDICINE

## 2021-03-14 ENCOUNTER — APPOINTMENT (OUTPATIENT)
Dept: CT IMAGING | Age: 86
End: 2021-03-14
Attending: EMERGENCY MEDICINE

## 2021-03-14 VITALS
OXYGEN SATURATION: 99 % | DIASTOLIC BLOOD PRESSURE: 68 MMHG | BODY MASS INDEX: 30.77 KG/M2 | TEMPERATURE: 98.4 F | WEIGHT: 156.75 LBS | HEART RATE: 66 BPM | HEIGHT: 60 IN | SYSTOLIC BLOOD PRESSURE: 166 MMHG | RESPIRATION RATE: 18 BRPM

## 2021-03-14 DIAGNOSIS — T14.8XXA ABRASION: ICD-10-CM

## 2021-03-14 DIAGNOSIS — S00.03XA CONTUSION OF SCALP, INITIAL ENCOUNTER: ICD-10-CM

## 2021-03-14 DIAGNOSIS — S09.90XA INJURY OF HEAD, INITIAL ENCOUNTER: Primary | ICD-10-CM

## 2021-03-14 LAB
ANION GAP SERPL CALC-SCNC: 7 MMOL/L (ref 10–20)
BASOPHILS # BLD: 0 K/MCL (ref 0–0.3)
BASOPHILS NFR BLD: 0 %
BUN SERPL-MCNC: 26 MG/DL (ref 6–20)
BUN/CREAT SERPL: 20 (ref 7–25)
CALCIUM SERPL-MCNC: 8.5 MG/DL (ref 8.4–10.2)
CHLORIDE SERPL-SCNC: 113 MMOL/L (ref 98–107)
CO2 SERPL-SCNC: 24 MMOL/L (ref 21–32)
CREAT SERPL-MCNC: 1.33 MG/DL (ref 0.67–1.17)
DEPRECATED RDW RBC: 49.2 FL (ref 39–50)
EOSINOPHIL # BLD: 0.1 K/MCL (ref 0–0.5)
EOSINOPHIL NFR BLD: 3 %
ERYTHROCYTE [DISTWIDTH] IN BLOOD: 15.1 % (ref 11–15)
FASTING DURATION TIME PATIENT: ABNORMAL H
GFR SERPLBLD BASED ON 1.73 SQ M-ARVRAT: 45 ML/MIN/1.73M2
GLUCOSE SERPL-MCNC: 124 MG/DL (ref 65–99)
HCT VFR BLD CALC: 36.2 % (ref 39–51)
HGB BLD-MCNC: 11.9 G/DL (ref 13–17)
IMM GRANULOCYTES # BLD AUTO: 0 K/MCL (ref 0–0.2)
IMM GRANULOCYTES # BLD: 1 %
INR PPP: 2.7
LYMPHOCYTES # BLD: 0.7 K/MCL (ref 1–4)
LYMPHOCYTES NFR BLD: 14 %
MCH RBC QN AUTO: 29.3 PG (ref 26–34)
MCHC RBC AUTO-ENTMCNC: 32.9 G/DL (ref 32–36.5)
MCV RBC AUTO: 89.2 FL (ref 78–100)
MONOCYTES # BLD: 0.7 K/MCL (ref 0.3–0.9)
MONOCYTES NFR BLD: 12 %
NEUTROPHILS # BLD: 3.7 K/MCL (ref 1.8–7.7)
NEUTROPHILS NFR BLD: 70 %
NRBC BLD MANUAL-RTO: 0 /100 WBC
PLATELET # BLD AUTO: 162 K/MCL (ref 140–450)
POTASSIUM SERPL-SCNC: 4.3 MMOL/L (ref 3.4–5.1)
PROTHROMBIN TIME: 27.1 SEC (ref 9.7–11.8)
RBC # BLD: 4.06 MIL/MCL (ref 4.5–5.9)
SODIUM SERPL-SCNC: 140 MMOL/L (ref 135–145)
WBC # BLD: 5.3 K/MCL (ref 4.2–11)

## 2021-03-14 PROCEDURE — 70450 CT HEAD/BRAIN W/O DYE: CPT

## 2021-03-14 PROCEDURE — 99284 EMERGENCY DEPT VISIT MOD MDM: CPT

## 2021-03-14 PROCEDURE — 80048 BASIC METABOLIC PNL TOTAL CA: CPT | Performed by: EMERGENCY MEDICINE

## 2021-03-14 PROCEDURE — 85610 PROTHROMBIN TIME: CPT | Performed by: EMERGENCY MEDICINE

## 2021-03-14 PROCEDURE — 85025 COMPLETE CBC W/AUTO DIFF WBC: CPT | Performed by: EMERGENCY MEDICINE

## 2021-03-14 PROCEDURE — 72125 CT NECK SPINE W/O DYE: CPT

## 2021-03-14 PROCEDURE — 36415 COLL VENOUS BLD VENIPUNCTURE: CPT

## 2021-03-14 ASSESSMENT — ENCOUNTER SYMPTOMS
FEVER: 0
EYE REDNESS: 0
AGITATION: 0
BACK PAIN: 0
ABDOMINAL PAIN: 0
DIZZINESS: 0
COUGH: 0

## 2021-03-14 ASSESSMENT — PAIN SCALES - GENERAL: PAINLEVEL_OUTOF10: 0

## 2021-03-15 LAB
RAINBOW EXTRA TUBES HOLD SPECIMEN: NORMAL

## 2021-03-26 ENCOUNTER — TELEPHONE (OUTPATIENT)
Dept: HEMATOLOGY/ONCOLOGY | Age: 86
End: 2021-03-26

## 2021-03-29 ENCOUNTER — TELEPHONE (OUTPATIENT)
Dept: HEMATOLOGY/ONCOLOGY | Age: 86
End: 2021-03-29

## 2021-03-29 ENCOUNTER — APPOINTMENT (OUTPATIENT)
Dept: NEUROLOGY | Age: 86
End: 2021-03-29
Attending: INTERNAL MEDICINE

## 2021-03-29 DIAGNOSIS — C61 PROSTATE CANCER (CMD): Primary | ICD-10-CM

## 2021-03-30 ENCOUNTER — HOSPITAL ENCOUNTER (OUTPATIENT)
Dept: LAB | Age: 86
Discharge: HOME | End: 2021-03-30
Attending: INTERNAL MEDICINE

## 2021-03-30 DIAGNOSIS — C61 PROSTATE CANCER (CMD): ICD-10-CM

## 2021-03-30 LAB — PSA SERPL-MCNC: 3.55 NG/ML

## 2021-03-30 PROCEDURE — 84153 ASSAY OF PSA TOTAL: CPT | Performed by: INTERNAL MEDICINE

## 2021-03-30 PROCEDURE — 36415 COLL VENOUS BLD VENIPUNCTURE: CPT | Performed by: INTERNAL MEDICINE

## 2021-04-08 ENCOUNTER — TELEPHONE (OUTPATIENT)
Dept: HEMATOLOGY/ONCOLOGY | Age: 86
End: 2021-04-08

## 2021-05-03 ENCOUNTER — TELEPHONE (OUTPATIENT)
Dept: HEMATOLOGY/ONCOLOGY | Age: 86
End: 2021-05-03

## 2021-05-07 ENCOUNTER — APPOINTMENT (OUTPATIENT)
Dept: ULTRASOUND IMAGING | Age: 86
End: 2021-05-07

## 2021-05-07 ENCOUNTER — APPOINTMENT (OUTPATIENT)
Dept: ULTRASOUND IMAGING | Age: 86
End: 2021-05-07
Attending: EMERGENCY MEDICINE

## 2021-05-07 ENCOUNTER — HOSPITAL ENCOUNTER (EMERGENCY)
Age: 86
Discharge: HOME | End: 2021-05-07
Attending: EMERGENCY MEDICINE

## 2021-05-07 VITALS
DIASTOLIC BLOOD PRESSURE: 70 MMHG | RESPIRATION RATE: 16 BRPM | SYSTOLIC BLOOD PRESSURE: 176 MMHG | OXYGEN SATURATION: 99 % | HEART RATE: 67 BPM | TEMPERATURE: 98.1 F

## 2021-05-07 DIAGNOSIS — G62.9 NEUROPATHY: Primary | ICD-10-CM

## 2021-05-07 PROCEDURE — 99283 EMERGENCY DEPT VISIT LOW MDM: CPT

## 2021-05-07 PROCEDURE — 93970 EXTREMITY STUDY: CPT

## 2021-05-07 ASSESSMENT — PAIN DESCRIPTION - PAIN TYPE: TYPE: ACUTE PAIN

## 2021-05-07 ASSESSMENT — PAIN SCALES - GENERAL: PAINLEVEL_OUTOF10: 5

## 2021-05-26 PROBLEM — Z85.118 HISTORY OF LUNG CANCER: Status: ACTIVE | Noted: 2020-01-01

## 2021-08-31 PROBLEM — Z51.81 MONITORING FOR LONG-TERM ANTICOAGULANT USE: Status: RESOLVED | Noted: 2019-08-07 | Resolved: 2021-01-01

## 2021-08-31 PROBLEM — Z79.01 MONITORING FOR LONG-TERM ANTICOAGULANT USE: Status: RESOLVED | Noted: 2019-08-07 | Resolved: 2021-01-01

## 2021-08-31 PROBLEM — Q21.1 PATENT FORAMEN OVALE: Status: RESOLVED | Noted: 2019-08-07 | Resolved: 2021-01-01

## 2021-08-31 PROBLEM — G45.9 TRANSIENT ISCHEMIC ATTACK: Status: RESOLVED | Noted: 2019-08-07 | Resolved: 2021-01-01

## 2023-08-28 NOTE — PROGRESS NOTES
Noted, TAVR eval TRANSITIONAL CARE MANAGEMENT - HOSPITAL DISCHARGE FOLLOW-UP    Contacted Ms. Vega regarding follow-up for palpitations after hospital discharge. She was discharged from the hospital on 8-27-23. Review of the After Visit Summary from the recent hospitalization indicates that the patient needs to follow up with electrophysiology and PCP.    She feels that she is doing fairly well at home. She describes she is very tired and did not sleep in the hospital and was just resting before call.   Her diet concern is none. Overall, the patient is eating well.   Ambulation: staying the same  Fever: is not present  Pain: none  Activities of Daily Living (global): Self-care   Patient states that she does have sufficient family support. She feels that she is able to ask for assistance when needed.     Additional patient/family concerns: None .    Discharge medications were verified with the patient. She is fully compliant with the medication regimen prescribed at the time of discharge. She reports that she is not experiencing any medication side effects.    Upon discharge, the patient was to receive specialist follow-up with Dr. FROST, electrophysiology and will call him today for follow up. She is already established with him for AFIB management..     Advance care planning documents on file - no    Patient has an appointment on 9- with Alyson Epps NP. Ms. Vega was reminded about the importance of keeping this appointment.    none

## (undated) DEVICE — CELL SAVER RESERVOIR BRAT

## (undated) DEVICE — GLOVE SURG TRIUMPH SZ 8

## (undated) DEVICE — BLADE STERNAL SAW BULK PACK

## (undated) DEVICE — TAPE UMBILICAL U11T

## (undated) DEVICE — GAUZE SPONGES,12 PLY: Brand: CURITY

## (undated) DEVICE — CLAMP INSERT: Brand: STEALTH® CLAMP INSERT

## (undated) DEVICE — STERILE POLYISOPRENE POWDER-FREE SURGICAL GLOVES: Brand: PROTEXIS

## (undated) DEVICE — OPEN HEART: Brand: MEDLINE INDUSTRIES, INC.

## (undated) DEVICE — TRANSPOSAL ULTRAFLEX DUO/QUAD ULTRA CART MANIFOLD

## (undated) DEVICE — SOL LACT RINGERS 1000ML

## (undated) DEVICE — CELL SAVER BAG 600ML 4R2023

## (undated) DEVICE — CELL SAVER 5/5+ BOWL KIT-225ML: Brand: HAEMONETICS CELL SAVER 5/5+ SYSTEMS

## (undated) NOTE — LETTER
[facility] [place] FOR BLOOD OR BLOOD COMPONENTS      In the course of your treatment, it may become necessary to administer a transfusion of blood or blood components.  This form provides basic information concerning this proc explain the alternatives to you if it has not already been done. I,Evin Barrientos, have read/had read to me the above. I understand the matters bearing on the decision whether or not to authorize a transfusion of blood or blood components.  I have no que

## (undated) NOTE — LETTER
[facility]  Yamilka Hernandez [phone] [facility], [address] [place] [address]    Consent for Operation    Date: __________________    Time: _______________    1.  I authorize the performance upon Miladis Barrientos the following operation:    Procedure(s):  transcatheter aorti revealed by the pictures or by descriptive texts accompanying them. If the procedure has been videotaped, the surgeon will obtain the original videotape. The hospital will not be responsible for storage or maintenance of this tape.     6. For the purpose of THAT MY DOCTOR PROVIDED ME WITH THE ABOVE EXPLANATIONS, THAT ALL BLANKS OR STATEMENTS REQUIRING INSERTION OR COMPLETION WERE FILLED IN.     Signature of Patient:   ___________________________    When the patient is a minor or mentally incompetent to give co · All of the medicines I take (including prescriptions, herbal supplements, and pills I can buy without a prescription (including street drugs/illegal medications).  Failure to inform my anesthesiologist about these medicines may increase my risk of anesthe _____________________________________________________________________________  Anesthesiologist Signature     Date   Time  I have discussed the procedure and information above with the patient (or patient’s representative) and answered their questions.  The

## (undated) NOTE — LETTER
Stephanie Villarreal M.D., F.A.C.S. Tj Andrews M.D., F.A.C.S. Nuria Thompson M.D., Adam Brewer. LUIS Hodges M.D., F.A.C.S. Arsenio Duran. Shayy Peñaloza M.D., F.A.C.S. Bi Carrillo M.D. KIMBERLY Sierra M.D., FNicanor chance to have all of your questions and concerns answered. If there are any issues which have not been adequately addressed, we ask you to bring them forward so that we can thoroughly address them.     A patient who is fully informed and understands their treatment, among other options and the risks and benefits of the different treatment options:    Yes _____ No _____    A [facility] surgeon as explained to me that if I should so desire, he/she is willing to explain my case and the surgical and non-surgical optio

## (undated) NOTE — LETTER
Consent to Procedure/Sedation    Date: _________10/26/2017_________    Time: ______1528_________    1. I authorize the performance upon Marci Barrientos the following: Insertion of Permanent Pacemaker        2.  I authorize  ____Elizabeth_____________________ Signature of person authorized to consent for patient: Relationship to patient:  ___________________________    ___________________    Witness: ____________________     Date: ______________    Printed: 10/26/2017   7:56 PM    Patient Name: Jeferson Burns

## (undated) NOTE — LETTER
Patient Name: Tam Ross        : 1924       Medical Record #: [id number]    CONSENT FOR PROCEDURES/SEDATION    Date: 10/25/2017       Time: 3:28 PM        1.  I authorize the performance upon Brie Barrientos the following: Insertion of [employer]